# Patient Record
Sex: FEMALE | Race: OTHER | NOT HISPANIC OR LATINO
[De-identification: names, ages, dates, MRNs, and addresses within clinical notes are randomized per-mention and may not be internally consistent; named-entity substitution may affect disease eponyms.]

---

## 2017-07-12 ENCOUNTER — APPOINTMENT (OUTPATIENT)
Dept: MRI IMAGING | Facility: CLINIC | Age: 52
End: 2017-07-12

## 2017-07-12 ENCOUNTER — OUTPATIENT (OUTPATIENT)
Dept: OUTPATIENT SERVICES | Facility: HOSPITAL | Age: 52
LOS: 1 days | End: 2017-07-12
Payer: COMMERCIAL

## 2017-07-12 DIAGNOSIS — Z00.8 ENCOUNTER FOR OTHER GENERAL EXAMINATION: ICD-10-CM

## 2017-07-12 PROCEDURE — 73718 MRI LOWER EXTREMITY W/O DYE: CPT

## 2017-08-26 ENCOUNTER — EMERGENCY (EMERGENCY)
Facility: HOSPITAL | Age: 52
LOS: 0 days | Discharge: HOME | End: 2017-08-27
Attending: FAMILY MEDICINE | Admitting: FAMILY MEDICINE
Payer: COMMERCIAL

## 2017-08-26 VITALS — HEIGHT: 68 IN | WEIGHT: 235.01 LBS

## 2017-08-26 DIAGNOSIS — R10.9 UNSPECIFIED ABDOMINAL PAIN: ICD-10-CM

## 2017-08-26 DIAGNOSIS — K29.70 GASTRITIS, UNSPECIFIED, WITHOUT BLEEDING: ICD-10-CM

## 2017-08-26 PROCEDURE — 99285 EMERGENCY DEPT VISIT HI MDM: CPT | Mod: 25

## 2017-08-26 RX ORDER — SODIUM CHLORIDE 9 MG/ML
3 INJECTION INTRAMUSCULAR; INTRAVENOUS; SUBCUTANEOUS ONCE
Qty: 0 | Refills: 0 | Status: COMPLETED | OUTPATIENT
Start: 2017-08-26 | End: 2017-08-26

## 2017-08-26 RX ORDER — HYDROMORPHONE HYDROCHLORIDE 2 MG/ML
1 INJECTION INTRAMUSCULAR; INTRAVENOUS; SUBCUTANEOUS ONCE
Qty: 0 | Refills: 0 | Status: DISCONTINUED | OUTPATIENT
Start: 2017-08-26 | End: 2017-08-26

## 2017-08-26 RX ORDER — SODIUM CHLORIDE 9 MG/ML
1000 INJECTION INTRAMUSCULAR; INTRAVENOUS; SUBCUTANEOUS ONCE
Qty: 0 | Refills: 0 | Status: COMPLETED | OUTPATIENT
Start: 2017-08-26 | End: 2017-08-26

## 2017-08-26 RX ORDER — PANTOPRAZOLE SODIUM 20 MG/1
40 TABLET, DELAYED RELEASE ORAL ONCE
Qty: 0 | Refills: 0 | Status: COMPLETED | OUTPATIENT
Start: 2017-08-26 | End: 2017-08-26

## 2017-08-26 RX ORDER — ONDANSETRON 8 MG/1
8 TABLET, FILM COATED ORAL ONCE
Qty: 0 | Refills: 0 | Status: COMPLETED | OUTPATIENT
Start: 2017-08-26 | End: 2017-08-27

## 2017-08-26 NOTE — ED PROVIDER NOTE - PROGRESS NOTE DETAILS
Shayna Tadeo: Chest X-ray normal JG:  Patients symptoms have improved.  Pt. will treat her nausea with zofran (prescription sent to pharmacy) and will follow up with PMD or return to ED if symptoms return or worsen.

## 2017-08-26 NOTE — ED PROVIDER NOTE - CONSTITUTIONAL, MLM
normal... Well appearing, well nourished, awake, alert, oriented to person, place, time/situation, Uncomfortable.

## 2017-08-26 NOTE — ED PROVIDER NOTE - OBJECTIVE STATEMENT
52 y-o Female with no significant PMHX, present to the ED c/o vomiting. Pt states she went to birthday party where she ate pasta. At 5pm PT developed ABD pain, at 6:30pm PT started to vomit x6 times since arrival to ED. C/o of epigastric pain that radiates to the left side and back, Sx include chills. Premenopausal, LNMP in June. PMD Chantell 52 y-o Female with no significant PMHX, present to the ED c/o vomiting. Pt states she went to birthday party where she ate Lasagna. At 5pm PT developed ABD pain, at 6:30pm PT started to vomit x6 since arrival to ED. C/o of epigastric pain that radiates to the left side and back, Sx include chills. Premenopausal, LNMP in June. PMD Chantell 52 y-o Female with no significant PMHX, present to the ED c/o vomiting. Pt states she went to birthday party where she ate Lasagna this afternoon. At 5pm PT developed ABD pain, at 6:30pm PT started to vomit x6 since arrival to ED. C/o of epigastric pain that radiates to the left side and back, Sx include chills. Premenopausal, LNMP in June. Denies any subjective fever PMD Chantell

## 2017-08-27 VITALS
DIASTOLIC BLOOD PRESSURE: 76 MMHG | SYSTOLIC BLOOD PRESSURE: 119 MMHG | HEART RATE: 78 BPM | TEMPERATURE: 98 F | OXYGEN SATURATION: 100 % | RESPIRATION RATE: 16 BRPM

## 2017-08-27 LAB
ABO RH CONFIRMATION: SIGNIFICANT CHANGE UP
ALBUMIN SERPL ELPH-MCNC: 3.5 G/DL — SIGNIFICANT CHANGE UP (ref 3.3–5)
ALP SERPL-CCNC: 95 U/L — SIGNIFICANT CHANGE UP (ref 40–120)
ALT FLD-CCNC: 29 U/L — SIGNIFICANT CHANGE UP (ref 12–78)
ANION GAP SERPL CALC-SCNC: 7 MMOL/L — SIGNIFICANT CHANGE UP (ref 5–17)
APPEARANCE UR: CLEAR — SIGNIFICANT CHANGE UP
APTT BLD: 28.4 SEC — SIGNIFICANT CHANGE UP (ref 27.5–37.4)
AST SERPL-CCNC: 14 U/L — LOW (ref 15–37)
BACTERIA # UR AUTO: (no result)
BILIRUB SERPL-MCNC: 0.7 MG/DL — SIGNIFICANT CHANGE UP (ref 0.2–1.2)
BILIRUB UR-MCNC: NEGATIVE — SIGNIFICANT CHANGE UP
BLD GP AB SCN SERPL QL: SIGNIFICANT CHANGE UP
BUN SERPL-MCNC: 14 MG/DL — SIGNIFICANT CHANGE UP (ref 7–23)
CALCIUM SERPL-MCNC: 8.4 MG/DL — LOW (ref 8.5–10.1)
CHLORIDE SERPL-SCNC: 105 MMOL/L — SIGNIFICANT CHANGE UP (ref 96–108)
CO2 SERPL-SCNC: 27 MMOL/L — SIGNIFICANT CHANGE UP (ref 22–31)
COLOR SPEC: YELLOW — SIGNIFICANT CHANGE UP
CREAT SERPL-MCNC: 0.81 MG/DL — SIGNIFICANT CHANGE UP (ref 0.5–1.3)
DIFF PNL FLD: (no result)
EPI CELLS # UR: SIGNIFICANT CHANGE UP
GLUCOSE SERPL-MCNC: 123 MG/DL — HIGH (ref 70–99)
GLUCOSE UR QL: NEGATIVE MG/DL — SIGNIFICANT CHANGE UP
HCT VFR BLD CALC: 38.2 % — SIGNIFICANT CHANGE UP (ref 34.5–45)
HGB BLD-MCNC: 13.2 G/DL — SIGNIFICANT CHANGE UP (ref 11.5–15.5)
INR BLD: 1.03 RATIO — SIGNIFICANT CHANGE UP (ref 0.88–1.16)
KETONES UR-MCNC: NEGATIVE — SIGNIFICANT CHANGE UP
LEUKOCYTE ESTERASE UR-ACNC: NEGATIVE — SIGNIFICANT CHANGE UP
LIDOCAIN IGE QN: 68 U/L — LOW (ref 73–393)
MANUAL DIF COMMENT BLD-IMP: SIGNIFICANT CHANGE UP
MCHC RBC-ENTMCNC: 27.7 PG — SIGNIFICANT CHANGE UP (ref 27–34)
MCHC RBC-ENTMCNC: 34.4 GM/DL — SIGNIFICANT CHANGE UP (ref 32–36)
MCV RBC AUTO: 80.6 FL — SIGNIFICANT CHANGE UP (ref 80–100)
NEUTROPHILS NFR BLD AUTO: 93.2 % — HIGH (ref 43–77)
NITRITE UR-MCNC: NEGATIVE — SIGNIFICANT CHANGE UP
PH UR: 8 — SIGNIFICANT CHANGE UP (ref 5–8)
PLAT MORPH BLD: NORMAL — SIGNIFICANT CHANGE UP
PLATELET # BLD AUTO: 183 K/UL — SIGNIFICANT CHANGE UP (ref 150–400)
POTASSIUM SERPL-MCNC: 3.4 MMOL/L — LOW (ref 3.5–5.3)
POTASSIUM SERPL-SCNC: 3.4 MMOL/L — LOW (ref 3.5–5.3)
PROT SERPL-MCNC: 7.2 GM/DL — SIGNIFICANT CHANGE UP (ref 6–8.3)
PROT UR-MCNC: NEGATIVE MG/DL — SIGNIFICANT CHANGE UP
PROTHROM AB SERPL-ACNC: 11.1 SEC — SIGNIFICANT CHANGE UP (ref 9.8–12.7)
RBC # BLD: 4.74 M/UL — SIGNIFICANT CHANGE UP (ref 3.8–5.2)
RBC # FLD: 12.8 % — SIGNIFICANT CHANGE UP (ref 10.3–14.5)
RBC BLD AUTO: NORMAL — SIGNIFICANT CHANGE UP
RBC CASTS # UR COMP ASSIST: (no result) /HPF (ref 0–4)
SODIUM SERPL-SCNC: 139 MMOL/L — SIGNIFICANT CHANGE UP (ref 135–145)
SP GR SPEC: 1.01 — SIGNIFICANT CHANGE UP (ref 1.01–1.02)
TYPE + AB SCN PNL BLD: SIGNIFICANT CHANGE UP
UROBILINOGEN FLD QL: NEGATIVE MG/DL — SIGNIFICANT CHANGE UP
WBC # BLD: 10.1 K/UL — SIGNIFICANT CHANGE UP (ref 3.8–10.5)
WBC # FLD AUTO: 10.1 K/UL — SIGNIFICANT CHANGE UP (ref 3.8–10.5)
WBC UR QL: SIGNIFICANT CHANGE UP

## 2017-08-27 PROCEDURE — 93010 ELECTROCARDIOGRAM REPORT: CPT

## 2017-08-27 PROCEDURE — 74177 CT ABD & PELVIS W/CONTRAST: CPT | Mod: 26

## 2017-08-27 PROCEDURE — 71010: CPT | Mod: 26

## 2017-08-27 RX ORDER — ONDANSETRON 8 MG/1
1 TABLET, FILM COATED ORAL
Qty: 10 | Refills: 0
Start: 2017-08-27

## 2017-08-27 RX ORDER — ONDANSETRON 8 MG/1
4 TABLET, FILM COATED ORAL ONCE
Qty: 0 | Refills: 0 | Status: COMPLETED | OUTPATIENT
Start: 2017-08-27 | End: 2017-08-27

## 2017-08-27 RX ADMIN — ONDANSETRON 8 MILLIGRAM(S): 8 TABLET, FILM COATED ORAL at 00:34

## 2017-08-27 RX ADMIN — HYDROMORPHONE HYDROCHLORIDE 1 MILLIGRAM(S): 2 INJECTION INTRAMUSCULAR; INTRAVENOUS; SUBCUTANEOUS at 01:00

## 2017-08-27 RX ADMIN — SODIUM CHLORIDE 3 MILLILITER(S): 9 INJECTION INTRAMUSCULAR; INTRAVENOUS; SUBCUTANEOUS at 00:34

## 2017-08-27 RX ADMIN — PANTOPRAZOLE SODIUM 40 MILLIGRAM(S): 20 TABLET, DELAYED RELEASE ORAL at 00:34

## 2017-08-27 RX ADMIN — SODIUM CHLORIDE 1000 MILLILITER(S): 9 INJECTION INTRAMUSCULAR; INTRAVENOUS; SUBCUTANEOUS at 00:34

## 2017-08-27 RX ADMIN — ONDANSETRON 4 MILLIGRAM(S): 8 TABLET, FILM COATED ORAL at 04:16

## 2017-08-27 RX ADMIN — HYDROMORPHONE HYDROCHLORIDE 1 MILLIGRAM(S): 2 INJECTION INTRAMUSCULAR; INTRAVENOUS; SUBCUTANEOUS at 00:34

## 2017-08-27 NOTE — ED ADULT NURSE REASSESSMENT NOTE - NS ED NURSE REASSESS COMMENT FT1
Patient resting comfortably, states relief of all symptoms after meds. Drinking PO contrast. States unable to urinate at this time. Awaiting CT. Will continue to monitor.

## 2017-08-31 ENCOUNTER — APPOINTMENT (OUTPATIENT)
Dept: CARDIOLOGY | Facility: CLINIC | Age: 52
End: 2017-08-31

## 2018-04-01 ENCOUNTER — EMERGENCY (EMERGENCY)
Facility: HOSPITAL | Age: 53
LOS: 0 days | Discharge: HOME | End: 2018-04-01
Attending: EMERGENCY MEDICINE | Admitting: EMERGENCY MEDICINE
Payer: COMMERCIAL

## 2018-04-01 VITALS — WEIGHT: 238.98 LBS

## 2018-04-01 VITALS
OXYGEN SATURATION: 100 % | HEART RATE: 76 BPM | TEMPERATURE: 98 F | DIASTOLIC BLOOD PRESSURE: 81 MMHG | SYSTOLIC BLOOD PRESSURE: 136 MMHG | RESPIRATION RATE: 16 BRPM

## 2018-04-01 DIAGNOSIS — R07.9 CHEST PAIN, UNSPECIFIED: ICD-10-CM

## 2018-04-01 DIAGNOSIS — Z82.49 FAMILY HISTORY OF ISCHEMIC HEART DISEASE AND OTHER DISEASES OF THE CIRCULATORY SYSTEM: ICD-10-CM

## 2018-04-01 LAB
ALBUMIN SERPL ELPH-MCNC: 3.8 G/DL — SIGNIFICANT CHANGE UP (ref 3.3–5)
ALP SERPL-CCNC: 98 U/L — SIGNIFICANT CHANGE UP (ref 40–120)
ALT FLD-CCNC: 26 U/L — SIGNIFICANT CHANGE UP (ref 12–78)
ANION GAP SERPL CALC-SCNC: 7 MMOL/L — SIGNIFICANT CHANGE UP (ref 5–17)
APTT BLD: 28.5 SEC — SIGNIFICANT CHANGE UP (ref 27.5–37.4)
AST SERPL-CCNC: 18 U/L — SIGNIFICANT CHANGE UP (ref 15–37)
BASOPHILS # BLD AUTO: 0.02 K/UL — SIGNIFICANT CHANGE UP (ref 0–0.2)
BASOPHILS NFR BLD AUTO: 0.2 % — SIGNIFICANT CHANGE UP (ref 0–2)
BILIRUB SERPL-MCNC: 0.3 MG/DL — SIGNIFICANT CHANGE UP (ref 0.2–1.2)
BUN SERPL-MCNC: 21 MG/DL — SIGNIFICANT CHANGE UP (ref 7–23)
CALCIUM SERPL-MCNC: 8.9 MG/DL — SIGNIFICANT CHANGE UP (ref 8.5–10.1)
CHLORIDE SERPL-SCNC: 103 MMOL/L — SIGNIFICANT CHANGE UP (ref 96–108)
CO2 SERPL-SCNC: 29 MMOL/L — SIGNIFICANT CHANGE UP (ref 22–31)
CREAT SERPL-MCNC: 1.06 MG/DL — SIGNIFICANT CHANGE UP (ref 0.5–1.3)
EOSINOPHIL # BLD AUTO: 0.08 K/UL — SIGNIFICANT CHANGE UP (ref 0–0.5)
EOSINOPHIL NFR BLD AUTO: 1 % — SIGNIFICANT CHANGE UP (ref 0–6)
GLUCOSE SERPL-MCNC: 106 MG/DL — HIGH (ref 70–99)
HCT VFR BLD CALC: 38.7 % — SIGNIFICANT CHANGE UP (ref 34.5–45)
HGB BLD-MCNC: 12.9 G/DL — SIGNIFICANT CHANGE UP (ref 11.5–15.5)
IMM GRANULOCYTES NFR BLD AUTO: 0.2 % — SIGNIFICANT CHANGE UP (ref 0–1.5)
INR BLD: 0.99 RATIO — SIGNIFICANT CHANGE UP (ref 0.88–1.16)
LYMPHOCYTES # BLD AUTO: 1.84 K/UL — SIGNIFICANT CHANGE UP (ref 1–3.3)
LYMPHOCYTES # BLD AUTO: 22.5 % — SIGNIFICANT CHANGE UP (ref 13–44)
MCHC RBC-ENTMCNC: 27 PG — SIGNIFICANT CHANGE UP (ref 27–34)
MCHC RBC-ENTMCNC: 33.3 GM/DL — SIGNIFICANT CHANGE UP (ref 32–36)
MCV RBC AUTO: 81.1 FL — SIGNIFICANT CHANGE UP (ref 80–100)
MONOCYTES # BLD AUTO: 0.52 K/UL — SIGNIFICANT CHANGE UP (ref 0–0.9)
MONOCYTES NFR BLD AUTO: 6.4 % — SIGNIFICANT CHANGE UP (ref 2–14)
NEUTROPHILS # BLD AUTO: 5.69 K/UL — SIGNIFICANT CHANGE UP (ref 1.8–7.4)
NEUTROPHILS NFR BLD AUTO: 69.7 % — SIGNIFICANT CHANGE UP (ref 43–77)
NRBC # BLD: 0 /100 WBCS — SIGNIFICANT CHANGE UP (ref 0–0)
NT-PROBNP SERPL-SCNC: 103 PG/ML — SIGNIFICANT CHANGE UP (ref 0–125)
PLATELET # BLD AUTO: 222 K/UL — SIGNIFICANT CHANGE UP (ref 150–400)
POTASSIUM SERPL-MCNC: 3.4 MMOL/L — LOW (ref 3.5–5.3)
POTASSIUM SERPL-SCNC: 3.4 MMOL/L — LOW (ref 3.5–5.3)
PROT SERPL-MCNC: 7.8 GM/DL — SIGNIFICANT CHANGE UP (ref 6–8.3)
PROTHROM AB SERPL-ACNC: 10.7 SEC — SIGNIFICANT CHANGE UP (ref 9.8–12.7)
RBC # BLD: 4.77 M/UL — SIGNIFICANT CHANGE UP (ref 3.8–5.2)
RBC # FLD: 14 % — SIGNIFICANT CHANGE UP (ref 10.3–14.5)
SODIUM SERPL-SCNC: 139 MMOL/L — SIGNIFICANT CHANGE UP (ref 135–145)
TROPONIN I SERPL-MCNC: <0.015 NG/ML — SIGNIFICANT CHANGE UP (ref 0.01–0.04)
TROPONIN I SERPL-MCNC: <0.015 NG/ML — SIGNIFICANT CHANGE UP (ref 0.01–0.04)
WBC # BLD: 8.17 K/UL — SIGNIFICANT CHANGE UP (ref 3.8–10.5)
WBC # FLD AUTO: 8.17 K/UL — SIGNIFICANT CHANGE UP (ref 3.8–10.5)

## 2018-04-01 PROCEDURE — 99285 EMERGENCY DEPT VISIT HI MDM: CPT

## 2018-04-01 PROCEDURE — 71045 X-RAY EXAM CHEST 1 VIEW: CPT | Mod: 26

## 2018-04-01 PROCEDURE — 93010 ELECTROCARDIOGRAM REPORT: CPT

## 2018-04-01 RX ORDER — SODIUM CHLORIDE 9 MG/ML
3 INJECTION INTRAMUSCULAR; INTRAVENOUS; SUBCUTANEOUS ONCE
Qty: 0 | Refills: 0 | Status: COMPLETED | OUTPATIENT
Start: 2018-04-01 | End: 2018-04-01

## 2018-04-01 RX ORDER — ASPIRIN/CALCIUM CARB/MAGNESIUM 324 MG
325 TABLET ORAL ONCE
Qty: 0 | Refills: 0 | Status: COMPLETED | OUTPATIENT
Start: 2018-04-01 | End: 2018-04-01

## 2018-04-01 RX ADMIN — SODIUM CHLORIDE 3 MILLILITER(S): 9 INJECTION INTRAMUSCULAR; INTRAVENOUS; SUBCUTANEOUS at 18:03

## 2018-04-01 RX ADMIN — Medication 325 MILLIGRAM(S): at 18:03

## 2018-04-01 NOTE — ED STATDOCS - NS_ ATTENDINGSCRIBEDETAILS _ED_A_ED_FT
I, Rafael Peres MD,  performed the initial face to face bedside interview with this patient regarding history of present illness, review of symptoms and relevant past medical, social and family history.  I completed an independent physical examination.    The history, relevant review of systems, past medical and surgical history, medical decision making, and physical examination was documented by the scribe in my presence and I attest to the accuracy of the documentation.

## 2018-04-01 NOTE — ED ADULT NURSE NOTE - OBJECTIVE STATEMENT
Pt c/o chest pain midsternal which began a few days ago. Pt denies previous cardiac hx. Denies SOB at this time. Denies use of any aspirin/nitro and denies radiation of pain.

## 2018-04-01 NOTE — ED STATDOCS - OBJECTIVE STATEMENT
54 y/o F with no pertinent PMHx presents to the ED c/o intermittent CP since this past Friday. Pt states that she felt okay yesterday, but discomfort returned today, currently experiencing here in the ED. Pt states that most of her family has cardiac issues, no early sudden cardiac death prompting her to come in to be eval. Pt currently calm, able to provide adequate hx and denies fever, cough, chills, SOB, abd pain, vomiting, diarrhea or any other acute c/o at this time, took Aleve with no alleviation of sx. PMD Jasmin. Cardio Rekha.

## 2018-04-01 NOTE — ED STATDOCS - ATTENDING CONTRIBUTION TO CARE
I, Rafael Peres MD,  performed the initial face to face bedside interview with this patient regarding history of present illness, review of symptoms and relevant past medical, social and family history.  I completed an independent physical examination.  I was the initial provider who evaluated this patient. I have signed out the follow up of any pending tests (i.e. labs, radiological studies) to the ACP.  I have communicated the patient’s plan of care and disposition with the ACP.

## 2018-04-01 NOTE — ED STATDOCS - PROGRESS NOTE DETAILS
SUSANA Klein: SUSANA Klein:  [facility] she presented with intermittent CP, denies any SOB. I will followup the labs with trop x 2, if neg will d chome. SUSANA Guyit:  Pt feeling well, denies any complaint. Trop x 2 neg. will dc home for outpt f/u with PMD and cardiologist.   An opportunity to ask questions was given.  Discussed the importance of prompt, close medical follow-up.  Patient will return with any changes, concerns or persistent / worsening symptoms.  Understanding of all instructions verbalized.

## 2018-04-01 NOTE — ED ADULT NURSE NOTE - DRUG PRE-SCREENING (DAST -1)
Intensive Care Unit   Advanced Practice Exam & Daily Communication Note    Patient Active Problem List   Diagnosis     RDS (respiratory distress syndrome in the )     Duodenal atresia     Malnutrition (H)     Need for observation and evaluation of  for sepsis     Hand anomaly     SGA (small for gestational age)      , gestational age 33 completed weeks     Twin birth     Temperature instability in      Trisomy 21       Vital Signs:  Temp:  [97.9  F (36.6  C)-98.9  F (37.2  C)] 98  F (36.7  C)  Heart Rate:  [110-154] 146  Resp:  [28-48] 48  BP: (76-85)/(46-60) 81/53  SpO2:  [97 %-100 %] 100 %    Weight:  Wt Readings from Last 1 Encounters:   18 1.43 kg (3 lb 2.4 oz) (<1 %)*     * Growth percentiles are based on Down Syndrome (0-36 Months) data.         Physical Exam:  General: Resting comfortably in isolette. In no acute distress.  HEENT: Normocephalic. Anterior fontanelle soft, flat. Scalp intact.  Sutures approximated and mobile. Eyes clear of drainage. Nose midline, nares appear patent. Neck supple.  Cardiovascular: Regular rate and rhythm. Soft murmur.    Peripheral/femoral pulses present, normal and symmetric. Extremities warm. Capillary refill <3 seconds peripherally and centrally.     Respiratory: Breath sounds clear with good aeration bilaterally.  No retractions or nasal flaring noted. No respiratory support in place.  Gastrointestinal: Abdomen full, soft. Hypoactive bowel sounds. Cord dry.  : Normal male genitalia, anus patent and appropriately positioned.     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 Muscle hypotonic for gestation.  Skin: Warm, pink, slight jaundice, no skin breakdown.    Neurologic: Hypotonic, reflexes symmetric and normal for gestation.     Parent Communication:  Parents were updated during rounds.    Scarlett JETER, CNP 2018 11:58 AM               Statement Selected

## 2018-04-01 NOTE — ED ADULT NURSE NOTE - IN THE PAST 12 MONTHS HAVE YOU USED DRUGS OTHER THAN THOSE REQUIRED FOR MEDICAL REASON?
Quality 226: Preventive Care And Screening: Tobacco Use: Screening And Cessation Intervention: Patient screened for tobacco and never smoked Detail Level: Detailed Quality 131: Pain Assessment And Follow-Up: Pain assessment using a standardized tool is documented as negative, no follow-up plan required Quality 111:Pneumonia Vaccination Status For Older Adults: Pneumococcal Vaccination Previously Received No

## 2018-06-14 ENCOUNTER — APPOINTMENT (OUTPATIENT)
Dept: DERMATOLOGY | Facility: CLINIC | Age: 53
End: 2018-06-14
Payer: COMMERCIAL

## 2018-06-14 VITALS — HEIGHT: 67 IN | WEIGHT: 231 LBS | BODY MASS INDEX: 36.26 KG/M2

## 2018-06-14 PROCEDURE — 99213 OFFICE O/P EST LOW 20 MIN: CPT

## 2018-06-14 RX ORDER — LEVOFLOXACIN 500 MG/1
500 TABLET, FILM COATED ORAL
Qty: 7 | Refills: 0 | Status: DISCONTINUED | COMMUNITY
Start: 2018-04-19

## 2018-06-14 RX ORDER — FLUTICASONE PROPIONATE 50 UG/1
50 SPRAY, METERED NASAL
Qty: 16 | Refills: 0 | Status: DISCONTINUED | COMMUNITY
Start: 2018-05-24

## 2018-06-14 RX ORDER — AMOXICILLIN AND CLAVULANATE POTASSIUM 875; 125 MG/1; MG/1
875-125 TABLET, COATED ORAL
Qty: 14 | Refills: 0 | Status: DISCONTINUED | COMMUNITY
Start: 2018-05-29

## 2019-07-23 NOTE — ED STATDOCS - RATE
Include Z78.9 (Other Specified Conditions Influencing Health Status) As An Associated Diagnosis?: No Post-Care Instructions: I reviewed with the patient in detail post-care instructions. Patient is to wear sunprotection, and avoid picking at any of the treated lesions. Pt may apply Vaseline to crusted or scabbing areas. 81 Medical Necessity Information: It is in your best interest to select a reason for this procedure from the list below. All of these items fulfill various CMS LCD requirements except the new and changing color options. Detail Level: Detailed Medical Necessity Clause: This procedure was medically necessary because the lesions that were treated were: Consent: The patient's consent was obtained including but not limited to risks of crusting, scabbing, blistering, scarring, darker or lighter pigmentary change, recurrence, incomplete removal and infection.

## 2020-10-27 ENCOUNTER — NON-APPOINTMENT (OUTPATIENT)
Age: 55
End: 2020-10-27

## 2020-10-27 ENCOUNTER — APPOINTMENT (OUTPATIENT)
Dept: INTERNAL MEDICINE | Facility: CLINIC | Age: 55
End: 2020-10-27
Payer: COMMERCIAL

## 2020-10-27 VITALS
SYSTOLIC BLOOD PRESSURE: 104 MMHG | BODY MASS INDEX: 37.89 KG/M2 | RESPIRATION RATE: 16 BRPM | TEMPERATURE: 96.8 F | WEIGHT: 250 LBS | HEIGHT: 68 IN | DIASTOLIC BLOOD PRESSURE: 70 MMHG | OXYGEN SATURATION: 97 % | HEART RATE: 76 BPM

## 2020-10-27 VITALS — DIASTOLIC BLOOD PRESSURE: 60 MMHG | SYSTOLIC BLOOD PRESSURE: 108 MMHG

## 2020-10-27 DIAGNOSIS — E66.3 OVERWEIGHT: ICD-10-CM

## 2020-10-27 DIAGNOSIS — Z82.3 FAMILY HISTORY OF STROKE: ICD-10-CM

## 2020-10-27 DIAGNOSIS — Z82.49 FAMILY HISTORY OF ISCHEMIC HEART DISEASE AND OTHER DISEASES OF THE CIRCULATORY SYSTEM: ICD-10-CM

## 2020-10-27 DIAGNOSIS — D22.9 MELANOCYTIC NEVI, UNSPECIFIED: ICD-10-CM

## 2020-10-27 DIAGNOSIS — Z83.3 FAMILY HISTORY OF DIABETES MELLITUS: ICD-10-CM
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
## 2020-10-27 PROCEDURE — 99396 PREV VISIT EST AGE 40-64: CPT | Mod: 25

## 2020-10-27 PROCEDURE — 99072 ADDL SUPL MATRL&STAF TM PHE: CPT

## 2020-10-27 PROCEDURE — 93000 ELECTROCARDIOGRAM COMPLETE: CPT

## 2020-10-27 PROCEDURE — 36415 COLL VENOUS BLD VENIPUNCTURE: CPT

## 2020-10-27 PROCEDURE — 90471 IMMUNIZATION ADMIN: CPT

## 2020-10-27 PROCEDURE — 90715 TDAP VACCINE 7 YRS/> IM: CPT

## 2020-10-27 RX ORDER — FLUOCINONIDE 0.5 MG/G
0.05 CREAM TOPICAL TWICE DAILY
Qty: 30 | Refills: 1 | Status: DISCONTINUED | COMMUNITY
Start: 2018-06-14 | End: 2020-10-27

## 2020-10-27 NOTE — PHYSICAL EXAM
[No Acute Distress] : no acute distress [Well Nourished] : well nourished [Well Developed] : well developed [Well-Appearing] : well-appearing [Normal Sclera/Conjunctiva] : normal sclera/conjunctiva [PERRL] : pupils equal round and reactive to light [EOMI] : extraocular movements intact [Normal Outer Ear/Nose] : the outer ears and nose were normal in appearance [Normal Oropharynx] : the oropharynx was normal [No JVD] : no jugular venous distention [No Lymphadenopathy] : no lymphadenopathy [Supple] : supple [Thyroid Normal, No Nodules] : the thyroid was normal and there were no nodules present [No Respiratory Distress] : no respiratory distress  [No Accessory Muscle Use] : no accessory muscle use [Clear to Auscultation] : lungs were clear to auscultation bilaterally [Normal Rate] : normal rate  [Regular Rhythm] : with a regular rhythm [Normal S1, S2] : normal S1 and S2 [No Murmur] : no murmur heard [No Carotid Bruits] : no carotid bruits [No Abdominal Bruit] : a ~M bruit was not heard ~T in the abdomen [No Varicosities] : no varicosities [Pedal Pulses Present] : the pedal pulses are present [No Edema] : there was no peripheral edema [No Palpable Aorta] : no palpable aorta [No Extremity Clubbing/Cyanosis] : no extremity clubbing/cyanosis [Normal Appearance] : normal in appearance [Soft] : abdomen soft [Non Tender] : non-tender [Non-distended] : non-distended [No Masses] : no abdominal mass palpated [No HSM] : no HSM [Normal Bowel Sounds] : normal bowel sounds [Normal Sphincter Tone] : normal sphincter tone [No Mass] : no mass [Normal Posterior Cervical Nodes] : no posterior cervical lymphadenopathy [Normal Anterior Cervical Nodes] : no anterior cervical lymphadenopathy [No CVA Tenderness] : no CVA  tenderness [No Spinal Tenderness] : no spinal tenderness [No Joint Swelling] : no joint swelling [Grossly Normal Strength/Tone] : grossly normal strength/tone [No Rash] : no rash [Coordination Grossly Intact] : coordination grossly intact [No Focal Deficits] : no focal deficits [Normal Gait] : normal gait [Normal Affect] : the affect was normal [Normal Insight/Judgement] : insight and judgment were intact [Stool Occult Blood] : stool negative for occult blood

## 2020-10-27 NOTE — HEALTH RISK ASSESSMENT
[Yes] : Yes [Monthly or less (1 pt)] : Monthly or less (1 point) [1 or 2 (0 pts)] : 1 or 2 (0 points) [Never (0 pts)] : Never (0 points) [No] : In the past 12 months have you used drugs other than those required for medical reasons? No [0] : 2) Feeling down, depressed, or hopeless: Not at all (0) [With Family] : lives with family [Employed] : employed [] :  [# Of Children ___] : has [unfilled] children [Feels Safe at Home] : Feels safe at home [Patient reported PAP Smear was normal] : Patient reported PAP Smear was normal [Patient/Caregiver not ready to engage] : Patient/Caregiver not ready to engage [Seat Belt] :  uses seat belt [Sunscreen] : uses sunscreen [] : No [de-identified] : not regular [de-identified] : will beginning diet for weight loss [EyeExamDate] : 1/1/19 [Reports changes in vision] : Reports no changes in vision [Reports changes in dental health] : Reports no changes in dental health [MammogramDate] : 1/1/19 [PapSmearDate] : 1/1/19 [ColonoscopyDate] : 1/1/17 [ColonoscopyComments] : polyp [FreeTextEntry2] : work at [employer] [FreeTextEntry3] : 1 grand child age 2 years old  lives in [place]

## 2020-10-28 LAB
25(OH)D3 SERPL-MCNC: 27 NG/ML
ALBUMIN SERPL ELPH-MCNC: 4.7 G/DL
ALP BLD-CCNC: 90 U/L
ALT SERPL-CCNC: 31 U/L
ANION GAP SERPL CALC-SCNC: 10 MMOL/L
AST SERPL-CCNC: 24 U/L
BASOPHILS # BLD AUTO: 0.02 K/UL
BASOPHILS NFR BLD AUTO: 0.4 %
BILIRUB SERPL-MCNC: 0.5 MG/DL
BUN SERPL-MCNC: 14 MG/DL
CALCIUM SERPL-MCNC: 9.9 MG/DL
CHLORIDE SERPL-SCNC: 107 MMOL/L
CHOLEST SERPL-MCNC: 170 MG/DL
CO2 SERPL-SCNC: 26 MMOL/L
CREAT SERPL-MCNC: 0.88 MG/DL
EOSINOPHIL # BLD AUTO: 0.06 K/UL
EOSINOPHIL NFR BLD AUTO: 1.2 %
ESTIMATED AVERAGE GLUCOSE: 88 MG/DL
GLUCOSE SERPL-MCNC: 102 MG/DL
HBA1C MFR BLD HPLC: 4.7 %
HCT VFR BLD CALC: 41.2 %
HDLC SERPL-MCNC: 37 MG/DL
HGB BLD-MCNC: 13 G/DL
IMM GRANULOCYTES NFR BLD AUTO: 0.2 %
LDLC SERPL CALC-MCNC: 110 MG/DL
LYMPHOCYTES # BLD AUTO: 1.63 K/UL
LYMPHOCYTES NFR BLD AUTO: 33.5 %
MAN DIFF?: NORMAL
MCHC RBC-ENTMCNC: 27.8 PG
MCHC RBC-ENTMCNC: 31.6 GM/DL
MCV RBC AUTO: 88.2 FL
MONOCYTES # BLD AUTO: 0.36 K/UL
MONOCYTES NFR BLD AUTO: 7.4 %
NEUTROPHILS # BLD AUTO: 2.79 K/UL
NEUTROPHILS NFR BLD AUTO: 57.3 %
NONHDLC SERPL-MCNC: 134 MG/DL
PLATELET # BLD AUTO: 230 K/UL
POTASSIUM SERPL-SCNC: 4.9 MMOL/L
PROT SERPL-MCNC: 7.4 G/DL
RBC # BLD: 4.67 M/UL
RBC # FLD: 15.2 %
SARS-COV-2 IGG SERPL IA-ACNC: 123 INDEX
SARS-COV-2 IGG SERPL QL IA: POSITIVE
SODIUM SERPL-SCNC: 143 MMOL/L
T4 SERPL-MCNC: 8.1 UG/DL
TRIGL SERPL-MCNC: 118 MG/DL
TSH SERPL-ACNC: 2.14 UIU/ML
WBC # FLD AUTO: 4.87 K/UL

## 2020-10-29 ENCOUNTER — NON-APPOINTMENT (OUTPATIENT)
Age: 55
End: 2020-10-29

## 2020-11-03 ENCOUNTER — APPOINTMENT (OUTPATIENT)
Dept: INTERNAL MEDICINE | Facility: CLINIC | Age: 55
End: 2020-11-03
Payer: COMMERCIAL

## 2020-11-03 PROCEDURE — 90471 IMMUNIZATION ADMIN: CPT

## 2020-11-03 PROCEDURE — 99072 ADDL SUPL MATRL&STAF TM PHE: CPT

## 2020-11-03 PROCEDURE — 90750 HZV VACC RECOMBINANT IM: CPT

## 2020-11-09 ENCOUNTER — NON-APPOINTMENT (OUTPATIENT)
Age: 55
End: 2020-11-09

## 2020-11-11 ENCOUNTER — NON-APPOINTMENT (OUTPATIENT)
Age: 55
End: 2020-11-11

## 2020-11-12 ENCOUNTER — NON-APPOINTMENT (OUTPATIENT)
Age: 55
End: 2020-11-12

## 2020-11-12 DIAGNOSIS — Z82.49 FAMILY HISTORY OF ISCHEMIC HEART DISEASE AND OTHER DISEASES OF THE CIRCULATORY SYSTEM: ICD-10-CM

## 2020-11-12 DIAGNOSIS — Z80.0 FAMILY HISTORY OF MALIGNANT NEOPLASM OF DIGESTIVE ORGANS: ICD-10-CM

## 2021-01-13 ENCOUNTER — APPOINTMENT (OUTPATIENT)
Dept: INTERNAL MEDICINE | Facility: CLINIC | Age: 56
End: 2021-01-13

## 2021-03-15 ENCOUNTER — APPOINTMENT (OUTPATIENT)
Dept: INTERNAL MEDICINE | Facility: CLINIC | Age: 56
End: 2021-03-15
Payer: COMMERCIAL

## 2021-03-15 PROCEDURE — 90471 IMMUNIZATION ADMIN: CPT

## 2021-03-15 PROCEDURE — 90750 HZV VACC RECOMBINANT IM: CPT

## 2021-03-15 PROCEDURE — 99072 ADDL SUPL MATRL&STAF TM PHE: CPT

## 2021-07-13 ENCOUNTER — NON-APPOINTMENT (OUTPATIENT)
Age: 56
End: 2021-07-13

## 2021-08-04 ENCOUNTER — NON-APPOINTMENT (OUTPATIENT)
Age: 56
End: 2021-08-04

## 2021-08-04 ENCOUNTER — TRANSCRIPTION ENCOUNTER (OUTPATIENT)
Age: 56
End: 2021-08-04

## 2021-08-04 LAB
COVID-19 NUCLEOCAPSID  GAM ANTIBODY INTERPRETATION: POSITIVE
SARS-COV-2 AB SERPL QL IA: 84.3 INDEX
SARS-COV-2 N GENE NPH QL NAA+PROBE: NOT DETECTED

## 2021-12-20 ENCOUNTER — NON-APPOINTMENT (OUTPATIENT)
Age: 56
End: 2021-12-20

## 2022-01-05 ENCOUNTER — RESULT REVIEW (OUTPATIENT)
Age: 57
End: 2022-01-05

## 2022-01-19 ENCOUNTER — NON-APPOINTMENT (OUTPATIENT)
Age: 57
End: 2022-01-19

## 2022-01-19 ENCOUNTER — APPOINTMENT (OUTPATIENT)
Dept: INTERNAL MEDICINE | Facility: CLINIC | Age: 57
End: 2022-01-19
Payer: COMMERCIAL

## 2022-01-19 VITALS
WEIGHT: 263 LBS | HEIGHT: 68 IN | TEMPERATURE: 98 F | SYSTOLIC BLOOD PRESSURE: 128 MMHG | DIASTOLIC BLOOD PRESSURE: 78 MMHG | BODY MASS INDEX: 39.86 KG/M2 | OXYGEN SATURATION: 99 % | HEART RATE: 83 BPM

## 2022-01-19 DIAGNOSIS — M54.50 LOW BACK PAIN, UNSPECIFIED: ICD-10-CM

## 2022-01-19 DIAGNOSIS — S76.919A STRAIN OF UNSPECIFIED MUSCLES, FASCIA AND TENDONS AT THIGH LEVEL, UNSPECIFIED THIGH, INITIAL ENCOUNTER: ICD-10-CM

## 2022-01-19 DIAGNOSIS — R92.8 OTHER ABNORMAL AND INCONCLUSIVE FINDINGS ON DIAGNOSTIC IMAGING OF BREAST: ICD-10-CM

## 2022-01-19 DIAGNOSIS — Z82.49 FAMILY HISTORY OF ISCHEMIC HEART DISEASE AND OTHER DISEASES OF THE CIRCULATORY SYSTEM: ICD-10-CM

## 2022-01-19 PROCEDURE — 99396 PREV VISIT EST AGE 40-64: CPT | Mod: 25

## 2022-01-19 PROCEDURE — 96127 BRIEF EMOTIONAL/BEHAV ASSMT: CPT

## 2022-01-19 PROCEDURE — 93000 ELECTROCARDIOGRAM COMPLETE: CPT

## 2022-01-19 PROCEDURE — 36415 COLL VENOUS BLD VENIPUNCTURE: CPT

## 2022-01-19 NOTE — HEALTH RISK ASSESSMENT
[Never] : Never [Yes] : Yes [Monthly or less (1 pt)] : Monthly or less (1 point) [1 or 2 (0 pts)] : 1 or 2 (0 points) [Never (0 pts)] : Never (0 points) [No] : In the past 12 months have you used drugs other than those required for medical reasons? No [0] : 2) Feeling down, depressed, or hopeless: Not at all (0) [EyeExamDate] : 2021 [MammogramDate] : 12/2021 [PapSmearDate] : 2021 [BoneDensityDate] : 11/2020

## 2022-01-20 DIAGNOSIS — E55.9 VITAMIN D DEFICIENCY, UNSPECIFIED: ICD-10-CM

## 2022-01-20 LAB
25(OH)D3 SERPL-MCNC: 16.3 NG/ML
ALBUMIN SERPL ELPH-MCNC: 4.2 G/DL
ALP BLD-CCNC: 120 U/L
ALT SERPL-CCNC: 24 U/L
ANION GAP SERPL CALC-SCNC: 12 MMOL/L
APPEARANCE: CLEAR
AST SERPL-CCNC: 16 U/L
BACTERIA: NEGATIVE
BASOPHILS # BLD AUTO: 0.02 K/UL
BASOPHILS NFR BLD AUTO: 0.3 %
BILIRUB SERPL-MCNC: 0.4 MG/DL
BILIRUBIN URINE: NEGATIVE
BLOOD URINE: NEGATIVE
BUN SERPL-MCNC: 16 MG/DL
CALCIUM SERPL-MCNC: 9.3 MG/DL
CHLORIDE SERPL-SCNC: 106 MMOL/L
CHOLEST SERPL-MCNC: 219 MG/DL
CO2 SERPL-SCNC: 23 MMOL/L
COLOR: YELLOW
CREAT SERPL-MCNC: 0.8 MG/DL
EOSINOPHIL # BLD AUTO: 0.1 K/UL
EOSINOPHIL NFR BLD AUTO: 1.4 %
GLUCOSE QUALITATIVE U: NEGATIVE
GLUCOSE SERPL-MCNC: 91 MG/DL
HCT VFR BLD CALC: 41.7 %
HCV AB SER QL: NONREACTIVE
HCV S/CO RATIO: 0.22 S/CO
HDLC SERPL-MCNC: 44 MG/DL
HGB BLD-MCNC: 13 G/DL
HYALINE CASTS: 2 /LPF
IMM GRANULOCYTES NFR BLD AUTO: 0.3 %
KETONES URINE: NEGATIVE
LDLC SERPL CALC-MCNC: 146 MG/DL
LEUKOCYTE ESTERASE URINE: NEGATIVE
LYMPHOCYTES # BLD AUTO: 1.67 K/UL
LYMPHOCYTES NFR BLD AUTO: 23 %
MAN DIFF?: NORMAL
MCHC RBC-ENTMCNC: 26.8 PG
MCHC RBC-ENTMCNC: 31.2 GM/DL
MCV RBC AUTO: 86 FL
MICROSCOPIC-UA: NORMAL
MONOCYTES # BLD AUTO: 0.4 K/UL
MONOCYTES NFR BLD AUTO: 5.5 %
NEUTROPHILS # BLD AUTO: 5.04 K/UL
NEUTROPHILS NFR BLD AUTO: 69.5 %
NITRITE URINE: NEGATIVE
NONHDLC SERPL-MCNC: 175 MG/DL
PH URINE: 5.5
PLATELET # BLD AUTO: 252 K/UL
POTASSIUM SERPL-SCNC: 4.2 MMOL/L
PROT SERPL-MCNC: 7.3 G/DL
PROTEIN URINE: NORMAL
RBC # BLD: 4.85 M/UL
RBC # FLD: 14.9 %
RED BLOOD CELLS URINE: 3 /HPF
SODIUM SERPL-SCNC: 141 MMOL/L
SPECIFIC GRAVITY URINE: 1.02
SQUAMOUS EPITHELIAL CELLS: 2 /HPF
T4 SERPL-MCNC: 7.4 UG/DL
TRIGL SERPL-MCNC: 145 MG/DL
TSH SERPL-ACNC: 1.29 UIU/ML
UROBILINOGEN URINE: NORMAL
WBC # FLD AUTO: 7.25 K/UL
WHITE BLOOD CELLS URINE: 1 /HPF

## 2022-02-04 ENCOUNTER — EMERGENCY (EMERGENCY)
Facility: HOSPITAL | Age: 57
LOS: 0 days | Discharge: HOME | End: 2022-02-04
Attending: EMERGENCY MEDICINE
Payer: COMMERCIAL

## 2022-02-04 VITALS
SYSTOLIC BLOOD PRESSURE: 163 MMHG | RESPIRATION RATE: 18 BRPM | TEMPERATURE: 98 F | DIASTOLIC BLOOD PRESSURE: 88 MMHG | OXYGEN SATURATION: 100 % | HEART RATE: 78 BPM

## 2022-02-04 VITALS — WEIGHT: 259.93 LBS | HEIGHT: 72 IN
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
## 2022-02-04 DIAGNOSIS — S01.81XA LACERATION WITHOUT FOREIGN BODY OF OTHER PART OF HEAD, INITIAL ENCOUNTER: ICD-10-CM

## 2022-02-04 DIAGNOSIS — S40.022A CONTUSION OF LEFT UPPER ARM, INITIAL ENCOUNTER: ICD-10-CM

## 2022-02-04 DIAGNOSIS — M79.622 PAIN IN LEFT UPPER ARM: ICD-10-CM
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
## 2022-02-04 PROCEDURE — G1004: CPT

## 2022-02-04 PROCEDURE — 12011 RPR F/E/E/N/L/M 2.5 CM/<: CPT

## 2022-02-04 PROCEDURE — 73080 X-RAY EXAM OF ELBOW: CPT | Mod: LT

## 2022-02-04 PROCEDURE — 99284 EMERGENCY DEPT VISIT MOD MDM: CPT | Mod: 25

## 2022-02-04 PROCEDURE — 99285 EMERGENCY DEPT VISIT HI MDM: CPT | Mod: 25

## 2022-02-04 PROCEDURE — 70450 CT HEAD/BRAIN W/O DYE: CPT | Mod: MG

## 2022-02-04 PROCEDURE — 73060 X-RAY EXAM OF HUMERUS: CPT | Mod: LT

## 2022-02-04 PROCEDURE — 73130 X-RAY EXAM OF HAND: CPT | Mod: 26,RT

## 2022-02-04 PROCEDURE — 73130 X-RAY EXAM OF HAND: CPT | Mod: RT

## 2022-02-04 PROCEDURE — 73060 X-RAY EXAM OF HUMERUS: CPT | Mod: 26,LT

## 2022-02-04 PROCEDURE — 73080 X-RAY EXAM OF ELBOW: CPT | Mod: 26,LT

## 2022-02-04 PROCEDURE — 70450 CT HEAD/BRAIN W/O DYE: CPT | Mod: 26,MG

## 2022-02-04 RX ORDER — ACETAMINOPHEN 500 MG
1000 TABLET ORAL ONCE
Refills: 0 | Status: DISCONTINUED | OUTPATIENT
Start: 2022-02-04 | End: 2022-02-04

## 2022-02-04 NOTE — ED ADULT NURSE NOTE - OBJECTIVE STATEMENT
pt arrived to ED c/o pain after skipping on ice and hitting her head. pt denies LOC. pt alert and oriented. no changes in vision, speech, - n/v. skin intact

## 2022-02-04 NOTE — ED STATDOCS - PROGRESS NOTE DETAILS
58 y/o F with no PMh presents with forehead laceration. Pt slipped on ice while in driveway. Fell on head and left arm, now with laceration. tetanus up to date. Denies LOC, AC use. PE: Well appearing. HEENT: +laceration to left forehead through eyebrow. PERRLA, EOMI. Nares patent. No hemotympanum. Cardiac: s1s2, RRR. lungs: CTAB. MSK: No obvious deformity to extremities. Diffuse TTP left UE. Full ROM in all UE joints. 5/5 UE strength bilaterally. Patient ambulatory. A/P: r/o fracture, intracranial injury. Plan for CT head, XR, lac repair, reassess. - Kev Reese PA-C

## 2022-02-04 NOTE — ED STATDOCS - OBJECTIVE STATEMENT
56 y/o female with no pertinent PMHx presents to the ED c/o laceration. Pt states she slipped on ice in the driveway and fell. Pt fell onto her head and left arm, now with a laceration to her left eyebrow region. Also with abrasions to mid-forehead and right hand as well as LUE pain. Denies LOC. PCP: Dr. Collier. 58 y/o female with no pertinent PMHx presents to the ED c/o laceration. Pt states she slipped on ice in the driveway and fell. Pt fell onto her head and left arm, now with a laceration to her left eyebrow region. Also with abrasions to mid-forehead and right hand as well as LUE pain. Denies LOC, n/v/d/abd pain or cp/sob. PCP: Dr. Collier.

## 2022-02-04 NOTE — ED STATDOCS - SKIN, MLM
skin normal color for race, warm, dry. +3 cm laceration to mid left-eyebrow. +Abrasions to left forehead and right thenar eminence. skin normal color for race, warm, dry. approx +3 cm laceration to mid left-eyebrow. +Abrasions to left forehead and right thenar eminence.

## 2022-02-04 NOTE — ED STATDOCS - NSFOLLOWUPINSTRUCTIONS_ED_ALL_ED_FT
USE MOTRIN OR TYLENOL AS NEEDED FOR PAIN. KEEP DRESSING ON FOR 24 HOURS. SUTURES WILL DISSOLVE IN APX 21 DAYS. WOUND WILL BE HEALED IN 1 WEEK. DO NOT IMMERSE WOUND IN WATER FOR 1 WEEK. RINSE CLEAN WITH RUNNING SOAPY WATER DAILY AFTER REMOVAL OF DRESSING. COVIER WITH BACITRACIN AND BANDAID TWICE DAILY.     Laceration    WHAT YOU NEED TO KNOW:    A laceration is an injury to the skin and the soft tissue underneath it. Lacerations happen when you are cut or hit by something. They can happen anywhere on the body.     DISCHARGE INSTRUCTIONS:    Return to the emergency department if:     You have heavy bleeding or bleeding that does not stop after 10 minutes of holding firm, direct pressure over the wound.       Your wound opens up.     Contact your healthcare provider if:     You have a fever or chills.       Your laceration is red, warm, or swollen.      You have red streaks on your skin coming from your wound.      You have white or yellow drainage from the wound that smells bad.      You have pain that gets worse, even after treatment.       You have questions or concerns about your condition or care.     Medicines:     Prescription pain medicine may be given. Ask how to take this medicine safely.       Antibiotics help treat or prevent a bacterial infection.       Take your medicine as directed. Contact your healthcare provider if you think your medicine is not helping or if you have side effects. Tell him or her if you are allergic to any medicine. Keep a list of the medicines, vitamins, and herbs you take. Include the amounts, and when and why you take them. Bring the list or the pill bottles to follow-up visits. Carry your medicine list with you in case of an emergency.    Care for your wound as directed:     Do not get your wound wet until your healthcare provider says it is okay. Do not soak your wound in water. Do not go swimming until your healthcare provider says it is okay. Carefully wash the wound with soap and water. Gently pat the area dry or allow it to air dry.       Change your bandages when they get wet, dirty, or after washing. Apply new, clean bandages as directed. Do not apply elastic bandages or tape too tight. Do not put powders or lotions over your incision.       Apply antibiotic ointment as directed. Your healthcare provider may give you antibiotic ointment to put over your wound if you have stitches. If you have strips of tape over your incision, let them dry up and fall off on their own. If they do not fall off within 14 days, gently remove them. If you have glue over your wound, do not remove or pick at it. If your glue comes off, do not replace it with glue that you have at home.       Check your wound every day for signs of infection such as swelling, redness, or pus.     Self-care:     Apply ice on your wound for 15 to 20 minutes every hour or as directed. Use an ice pack, or put crushed ice in a plastic bag. Cover it with a towel. Ice helps prevent tissue damage and decreases swelling and pain.      Use a splint as directed. A splint will decrease movement and stress on your wound. It may help it heal faster. A splint may be used for lacerations over joints or areas of your body that bend. Ask your healthcare provider how to apply and remove a splint.       Decrease scarring of your wound by applying ointments as directed. Do not apply ointments until your healthcare provider says it is okay. You may need to wait until your wound is healed. Ask which ointment to buy and how often to use it. After your wound is healed, use sunscreen over the area when you are out in the sun. You should do this for at least 6 months to 1 year after your injury.     Follow up with your healthcare provider as directed: You may need to follow up in 24 to 48 hours to have your wound checked for infection. You will need to return in 3 to 14 days if you have stitches or staples so they can be removed. Care for your wound as directed to prevent infection and help it heal. Write down your questions so you remember to ask them during your visits.

## 2022-02-04 NOTE — ED STATDOCS - ATTENDING CONTRIBUTION TO CARE
I, Asad Mckeon, performed the initial face to face bedside interview with this patient regarding history of present illness, review of symptoms and relevant past medical, social and family history.  I completed an independent physical examination.  I was the initial provider who evaluated this patient. I have signed out the follow up of any pending tests (i.e. labs, radiological studies) to the ACP.  I have communicated the patient’s plan of care and disposition with the ACP.  The history, relevant review of systems, past medical and surgical history, medical decision making, and physical examination was documented by the scribe in my presence and I attest to the accuracy of the documentation.    pt in ED c/o head pain s/p slip on ice with fall onto left elbow and then hitting head.   pt denies any LOC< change in vision, n/v/d/abd pain.    no meds taken for pain.   states saw blood from head.   visible approx 3 cm lac vertical over left mid eyebrow.   abrasions to left forehead and right hand.   mild ttp of left elbow and humerus region.   no palpable crepitus or visible deformity.   will CT, XRs, meds and reeval

## 2022-02-04 NOTE — ED STATDOCS - PATIENT PORTAL LINK FT
You can access the FollowMyHealth Patient Portal offered by [facility] by registering at the following website: [email or web address]. By joining [employer]’s FollowMyHealth portal, you will also be able to view your health information using other applications (apps) compatible with our system.

## 2022-02-04 NOTE — ED STATDOCS - MUSCULOSKELETAL, MLM
range of motion is not limited and there is no muscle tenderness. +Mild TTP of LUE. No visible deformity or crepitus.

## 2022-02-08 ENCOUNTER — NON-APPOINTMENT (OUTPATIENT)
Age: 57
End: 2022-02-08

## 2022-02-09 ENCOUNTER — NON-APPOINTMENT (OUTPATIENT)
Age: 57
End: 2022-02-09

## 2022-03-04 ENCOUNTER — NON-APPOINTMENT (OUTPATIENT)
Age: 57
End: 2022-03-04

## 2022-03-04 ENCOUNTER — APPOINTMENT (OUTPATIENT)
Dept: CARDIOLOGY | Facility: CLINIC | Age: 57
End: 2022-03-04
Payer: COMMERCIAL

## 2022-03-04 VITALS
BODY MASS INDEX: 41.37 KG/M2 | HEIGHT: 68 IN | OXYGEN SATURATION: 99 % | WEIGHT: 273 LBS | DIASTOLIC BLOOD PRESSURE: 88 MMHG | SYSTOLIC BLOOD PRESSURE: 142 MMHG | HEART RATE: 97 BPM

## 2022-03-04 VITALS — SYSTOLIC BLOOD PRESSURE: 140 MMHG | DIASTOLIC BLOOD PRESSURE: 80 MMHG

## 2022-03-04 PROCEDURE — 93000 ELECTROCARDIOGRAM COMPLETE: CPT

## 2022-03-04 PROCEDURE — 99244 OFF/OP CNSLTJ NEW/EST MOD 40: CPT

## 2022-03-04 NOTE — REVIEW OF SYSTEMS
[Weight Gain (___ Lbs)] : [unfilled] ~Ulb weight gain [Feeling Fatigued] : feeling fatigued [Dyspnea on exertion] : dyspnea during exertion [Chest Discomfort] : no chest discomfort [Cough] : no cough [Under Stress] : under stress [Negative] : Heme/Lymph

## 2022-03-04 NOTE — HISTORY OF PRESENT ILLNESS
[FreeTextEntry1] : Kailey Callejas is a 57-year-old woman with a history of obesity, COVID-19 infection, hyperlipidemia, who presents for cardiac examination because of exertional dyspnea.\par \par She describes moderate illness from COVID-19 in summer of 2020 -- missed 2 months of work (but did not require hospitalization); has subsequently been experiencing dyspnea with exertion; no associated chest pain or pressure; dyspnea is not present at rest. She has no past history of heart disease. She will be traveling to [place] to spend one month with family later this month.  \par \par * In preparation for today's visit I reviewed recent laboratory data (1/19/2022):  Hyperlipidemia (Total 219, , Trig 145, HDL 44); hemoglobin normal (13).\par

## 2022-03-04 NOTE — DISCUSSION/SUMMARY
Patient last seen in the clinic 12-4-2019 with a follow up scheduled for 6-. Patient no showed to appointment 2-. Refill for Carvedilol was sent to the patients preferred pharmacy.
[FreeTextEntry1] : \par Exertional dyspnea: Today's ECG appears normal; no past history of heart disease; status post COVID-19 infection (2020).  I recommend further evaluation with echocardiography and exercise ECG stress testing -- patient will be going to [place] later this month and would like to return for testing in April (upon her return).\par \par Hyperlipidemia: Moderately elevated total cholesterol and LDL with low HDL; trial of diet, exercise, and weight loss is reasonable.\par \par Elevated blood pressure in the absence of established hypertension: Blood pressure moderately elevated during today's visit (checked multiple times); she tells the blood pressure is usually "much lower."  I recommend continued observation with lifestyle changes.

## 2022-03-04 NOTE — PHYSICAL EXAM
[Obese] : obese [Normal S1, S2] : normal S1, S2 [No Murmur] : no murmur [Clear Lung Fields] : clear lung fields [Soft] : abdomen soft [Normal Bowel Sounds] : normal bowel sounds [Gait - Sufficient for Exercise Testing] : gait - sufficient for exercise testing [No Edema] : no edema [No Rash] : no rash [Moves all extremities] : moves all extremities [Normal Speech] : normal speech [Alert and Oriented] : alert and oriented [de-identified] : Extraocular muscles intact [de-identified] : Wearing a facemask [de-identified] : No JVD [de-identified] : Central obesity

## 2022-04-06 NOTE — ED ADULT NURSE NOTE - PAIN RATING/NUMBER SCALE (0-10): REST
Physical Therapy Discharge Summary Addendum:  Date: 4/6/2022  Total Number of Visits: 8  Referred by: Peggy Wayne MD  Medical Diagnosis (from order):   Diagnosis Information      Diagnosis    724.2, 338.29 (ICD-9-CM) - M54.50, G89.29 (ICD-10-CM) - Chronic midline low back pain without sciatica                Patient was last seen on 3/3/2022.  Patient was doing well at that time.  Patient chose to continue with independent home exercise program.  Patient has not contacted department regarding further therapy.  Therefore, Patient will be formally discharged at this time.  Status of goals: per status in last daily treatment note  Outcome Measures:   Lower Extremity Functional Scale: LEFS Calculated Total: 71   (0=extreme difficulty; 80=no difficulty) see flowsheet for additional documentation      HOOS, KOOS, LEFS, NDI, Oswestry, QDASH enter via Test-Outcome and rehab smart/dot phrase     8

## 2022-04-26 ENCOUNTER — APPOINTMENT (OUTPATIENT)
Dept: CARDIOLOGY | Facility: CLINIC | Age: 57
End: 2022-04-26
Payer: COMMERCIAL

## 2022-04-26 PROCEDURE — 93306 TTE W/DOPPLER COMPLETE: CPT

## 2022-04-26 PROCEDURE — 93015 CV STRESS TEST SUPVJ I&R: CPT

## 2022-08-15 NOTE — ED PROVIDER NOTE - NS ED MD EM SELECTION
Writer called and left patient a message that she missed her PAT appointment and must have the PAT phone call or the procedure will be cancelled. José Luisr asked her to call back. [phone] Comprehensive

## 2023-06-06 ENCOUNTER — NON-APPOINTMENT (OUTPATIENT)
Age: 58
End: 2023-06-06

## 2023-06-06 ENCOUNTER — APPOINTMENT (OUTPATIENT)
Dept: INTERNAL MEDICINE | Facility: CLINIC | Age: 58
End: 2023-06-06
Payer: COMMERCIAL

## 2023-06-06 VITALS
WEIGHT: 252 LBS | DIASTOLIC BLOOD PRESSURE: 64 MMHG | HEIGHT: 68 IN | BODY MASS INDEX: 38.19 KG/M2 | OXYGEN SATURATION: 99 % | HEART RATE: 82 BPM | SYSTOLIC BLOOD PRESSURE: 118 MMHG | TEMPERATURE: 97.9 F

## 2023-06-06 DIAGNOSIS — R06.09 OTHER FORMS OF DYSPNEA: ICD-10-CM

## 2023-06-06 DIAGNOSIS — U07.1 COVID-19: ICD-10-CM

## 2023-06-06 DIAGNOSIS — K63.5 POLYP OF COLON: ICD-10-CM

## 2023-06-06 DIAGNOSIS — Z00.00 ENCOUNTER FOR GENERAL ADULT MEDICAL EXAMINATION W/OUT ABNORMAL FINDINGS: ICD-10-CM

## 2023-06-06 DIAGNOSIS — E78.5 HYPERLIPIDEMIA, UNSPECIFIED: ICD-10-CM

## 2023-06-06 PROCEDURE — 93000 ELECTROCARDIOGRAM COMPLETE: CPT

## 2023-06-06 PROCEDURE — 99396 PREV VISIT EST AGE 40-64: CPT | Mod: 25

## 2023-06-06 PROCEDURE — 36415 COLL VENOUS BLD VENIPUNCTURE: CPT

## 2023-06-06 RX ORDER — ERGOCALCIFEROL 1.25 MG/1
1.25 MG CAPSULE ORAL
Qty: 12 | Refills: 0 | Status: DISCONTINUED | COMMUNITY
Start: 2022-01-20 | End: 2023-06-06

## 2023-06-06 NOTE — HEALTH RISK ASSESSMENT
[Yes] : Yes [Monthly or less (1 pt)] : Monthly or less (1 point) [1 or 2 (0 pts)] : 1 or 2 (0 points) [Never (0 pts)] : Never (0 points) [No] : In the past 12 months have you used drugs other than those required for medical reasons? No [0] : 2) Feeling down, depressed, or hopeless: Not at all (0) [Never] : Never [No falls in past year] : Patient reported no falls in the past year [PHQ-2 Negative - No further assessment needed] : PHQ-2 Negative - No further assessment needed [With Family] : lives with family [] :  [Fully functional (bathing, dressing, toileting, transferring, walking, feeding)] : Fully functional (bathing, dressing, toileting, transferring, walking, feeding) [Fully functional (using the telephone, shopping, preparing meals, housekeeping, doing laundry, using] : Fully functional and needs no help or supervision to perform IADLs (using the telephone, shopping, preparing meals, housekeeping, doing laundry, using transportation, managing medications and managing finances) [Smoke Detector] : smoke detector [Carbon Monoxide Detector] : carbon monoxide detector [Seat Belt] :  uses seat belt [Sunscreen] : uses sunscreen [With Patient/Caregiver] : , with patient/caregiver [Patient reported mammogram was normal] : Patient reported mammogram was normal [Patient reported PAP Smear was normal] : Patient reported PAP Smear was normal [de-identified] : mike [de-identified] : calories [CHQ3Oisri] : 0 [EyeExamDate] : 1/1/23 [Change in mental status noted] : No change in mental status noted [MammogramDate] : 12/1/22 [PapSmearDate] : 1/1/20 [AdvancecareDate] : 6/6/23

## 2023-06-07 ENCOUNTER — NON-APPOINTMENT (OUTPATIENT)
Age: 58
End: 2023-06-07

## 2023-06-07 LAB
25(OH)D3 SERPL-MCNC: 32.3 NG/ML
ALBUMIN SERPL ELPH-MCNC: 4.2 G/DL
ALP BLD-CCNC: 114 U/L
ALT SERPL-CCNC: 22 U/L
AMYLASE/CREAT SERPL: 61 U/L
ANION GAP SERPL CALC-SCNC: 9 MMOL/L
APPEARANCE: CLEAR
AST SERPL-CCNC: 17 U/L
BACTERIA: NEGATIVE /HPF
BILIRUB DIRECT SERPL-MCNC: 0.1 MG/DL
BILIRUB SERPL-MCNC: 0.4 MG/DL
BILIRUBIN URINE: NEGATIVE
BLOOD URINE: NEGATIVE
BUN SERPL-MCNC: 14 MG/DL
CALCIUM SERPL-MCNC: 9.5 MG/DL
CAST: 0 /LPF
CHLORIDE SERPL-SCNC: 107 MMOL/L
CHOLEST SERPL-MCNC: 212 MG/DL
CO2 SERPL-SCNC: 26 MMOL/L
COLOR: YELLOW
CREAT SERPL-MCNC: 0.75 MG/DL
EGFR: 91 ML/MIN/1.73M2
EPITHELIAL CELLS: 1 /HPF
ESTIMATED AVERAGE GLUCOSE: 103 MG/DL
FERRITIN SERPL-MCNC: 114 NG/ML
GLUCOSE QUALITATIVE U: NEGATIVE MG/DL
GLUCOSE SERPL-MCNC: 94 MG/DL
HBA1C MFR BLD HPLC: 5.2 %
HCV AB SER QL: NONREACTIVE
HCV S/CO RATIO: 0.2 S/CO
HDLC SERPL-MCNC: 47 MG/DL
IRON SERPL-MCNC: 56 UG/DL
KETONES URINE: NEGATIVE MG/DL
LDLC SERPL CALC-MCNC: 131 MG/DL
LEUKOCYTE ESTERASE URINE: ABNORMAL
LPL SERPL-CCNC: 14 U/L
MICROSCOPIC-UA: NORMAL
NITRITE URINE: NEGATIVE
NONHDLC SERPL-MCNC: 165 MG/DL
PH URINE: 6
POTASSIUM SERPL-SCNC: 4.8 MMOL/L
PROT SERPL-MCNC: 7.2 G/DL
PROTEIN URINE: NEGATIVE MG/DL
RED BLOOD CELLS URINE: 0 /HPF
SODIUM SERPL-SCNC: 143 MMOL/L
SPECIFIC GRAVITY URINE: 1.01
T4 SERPL-MCNC: 7.9 UG/DL
TRIGL SERPL-MCNC: 171 MG/DL
TSH SERPL-ACNC: 1.15 UIU/ML
UROBILINOGEN URINE: 0.2 MG/DL
WHITE BLOOD CELLS URINE: 0 /HPF

## 2023-08-24 ENCOUNTER — NON-APPOINTMENT (OUTPATIENT)
Age: 58
End: 2023-08-24

## 2023-10-13 ENCOUNTER — APPOINTMENT (OUTPATIENT)
Dept: FAMILY MEDICINE | Facility: CLINIC | Age: 58
End: 2023-10-13
Payer: COMMERCIAL

## 2023-10-13 VITALS
SYSTOLIC BLOOD PRESSURE: 130 MMHG | WEIGHT: 265 LBS | HEART RATE: 75 BPM | DIASTOLIC BLOOD PRESSURE: 76 MMHG | OXYGEN SATURATION: 98 % | TEMPERATURE: 98.2 F | BODY MASS INDEX: 40.16 KG/M2 | HEIGHT: 68 IN

## 2023-10-13 DIAGNOSIS — J06.9 ACUTE UPPER RESPIRATORY INFECTION, UNSPECIFIED: ICD-10-CM

## 2023-10-13 PROCEDURE — 99214 OFFICE O/P EST MOD 30 MIN: CPT

## 2023-12-01 ENCOUNTER — LABORATORY RESULT (OUTPATIENT)
Age: 58
End: 2023-12-01

## 2023-12-01 ENCOUNTER — APPOINTMENT (OUTPATIENT)
Dept: RHEUMATOLOGY | Facility: CLINIC | Age: 58
End: 2023-12-01
Payer: COMMERCIAL

## 2023-12-01 VITALS
BODY MASS INDEX: 41.37 KG/M2 | OXYGEN SATURATION: 97 % | TEMPERATURE: 96.6 F | WEIGHT: 273 LBS | DIASTOLIC BLOOD PRESSURE: 84 MMHG | SYSTOLIC BLOOD PRESSURE: 154 MMHG | HEART RATE: 82 BPM | HEIGHT: 68 IN

## 2023-12-01 DIAGNOSIS — M25.50 PAIN IN UNSPECIFIED JOINT: ICD-10-CM

## 2023-12-01 DIAGNOSIS — M25.551 PAIN IN RIGHT HIP: ICD-10-CM

## 2023-12-01 PROCEDURE — 20610 DRAIN/INJ JOINT/BURSA W/O US: CPT | Mod: RT

## 2023-12-01 PROCEDURE — 99205 OFFICE O/P NEW HI 60 MIN: CPT | Mod: 25

## 2023-12-01 RX ORDER — TRIAMCINOLONE ACETONIDE 80 MG/ML
80 INJECTION, SUSPENSION INTRA-ARTICULAR; INTRAMUSCULAR
Qty: 8 | Refills: 0 | Status: COMPLETED | OUTPATIENT
Start: 2023-12-01

## 2023-12-01 RX ORDER — ALBUTEROL SULFATE 90 UG/1
108 (90 BASE) INHALANT RESPIRATORY (INHALATION)
Qty: 1 | Refills: 0 | Status: DISCONTINUED | COMMUNITY
Start: 2023-10-13 | End: 2023-12-01

## 2023-12-01 RX ORDER — AZITHROMYCIN 250 MG/1
250 TABLET, FILM COATED ORAL
Qty: 1 | Refills: 0 | Status: DISCONTINUED | COMMUNITY
Start: 2023-10-13 | End: 2023-12-01

## 2023-12-01 RX ADMIN — TRIAMCINOLONE ACETONIDE 0 MG/ML: 80 INJECTION, SUSPENSION INTRA-ARTICULAR; INTRAMUSCULAR at 00:00

## 2023-12-04 LAB
ALBUMIN SERPL ELPH-MCNC: 4.2 G/DL
ALP BLD-CCNC: 118 U/L
ALT SERPL-CCNC: 24 U/L
ANA SER IF-ACNC: NEGATIVE
ANION GAP SERPL CALC-SCNC: 15 MMOL/L
AST SERPL-CCNC: 16 U/L
BASOPHILS # BLD AUTO: 0.03 K/UL
BASOPHILS NFR BLD AUTO: 0.4 %
BILIRUB SERPL-MCNC: 0.3 MG/DL
BUN SERPL-MCNC: 19 MG/DL
CALCIUM SERPL-MCNC: 9.4 MG/DL
CCP AB SER IA-ACNC: <8 UNITS
CHLORIDE SERPL-SCNC: 105 MMOL/L
CO2 SERPL-SCNC: 24 MMOL/L
CREAT SERPL-MCNC: 0.71 MG/DL
CRP SERPL-MCNC: 7 MG/L
EGFR: 97 ML/MIN/1.73M2
EOSINOPHIL # BLD AUTO: 0.14 K/UL
EOSINOPHIL NFR BLD AUTO: 2 %
ERYTHROCYTE [SEDIMENTATION RATE] IN BLOOD BY WESTERGREN METHOD: 30 MM/HR
GLUCOSE SERPL-MCNC: 119 MG/DL
HCT VFR BLD CALC: 38.3 %
HGB BLD-MCNC: 12.7 G/DL
IMM GRANULOCYTES NFR BLD AUTO: 0.6 %
LYMPHOCYTES # BLD AUTO: 2.01 K/UL
LYMPHOCYTES NFR BLD AUTO: 28.1 %
MAN DIFF?: NORMAL
MCHC RBC-ENTMCNC: 28 PG
MCHC RBC-ENTMCNC: 33.2 GM/DL
MCV RBC AUTO: 84.4 FL
MONOCYTES # BLD AUTO: 0.53 K/UL
MONOCYTES NFR BLD AUTO: 7.4 %
NEUTROPHILS # BLD AUTO: 4.41 K/UL
NEUTROPHILS NFR BLD AUTO: 61.5 %
PLATELET # BLD AUTO: 242 K/UL
POTASSIUM SERPL-SCNC: 4 MMOL/L
PROT SERPL-MCNC: 7.3 G/DL
RBC # BLD: 4.54 M/UL
RBC # FLD: 14.3 %
RF+CCP IGG SER-IMP: NEGATIVE
RHEUMATOID FACT SER QL: <10 IU/ML
SODIUM SERPL-SCNC: 144 MMOL/L
WBC # FLD AUTO: 7.16 K/UL

## 2023-12-05 LAB
ALBUMIN MFR SERPL ELPH: 58.2 %
ALBUMIN SERPL-MCNC: 4.1 G/DL
ALBUMIN/GLOB SERPL: 1.4 RATIO
ALPHA1 GLOB MFR SERPL ELPH: 4 %
ALPHA1 GLOB SERPL ELPH-MCNC: 0.3 G/DL
ALPHA2 GLOB MFR SERPL ELPH: 7.9 %
ALPHA2 GLOB SERPL ELPH-MCNC: 0.6 G/DL
B-GLOBULIN MFR SERPL ELPH: 11.5 %
B-GLOBULIN SERPL ELPH-MCNC: 0.8 G/DL
GAMMA GLOB FLD ELPH-MCNC: 1.3 G/DL
GAMMA GLOB MFR SERPL ELPH: 18.4 %
INTERPRETATION SERPL IEP-IMP: NORMAL
M PROTEIN SPEC IFE-MCNC: NORMAL
PROT SERPL-MCNC: 7.1 G/DL
PROT SERPL-MCNC: 7.1 G/DL

## 2023-12-08 LAB — G6PD SER-CCNC: 15 U/G HGB

## 2023-12-13 ENCOUNTER — APPOINTMENT (OUTPATIENT)
Dept: ULTRASOUND IMAGING | Facility: CLINIC | Age: 58
End: 2023-12-13
Payer: COMMERCIAL

## 2023-12-13 ENCOUNTER — OUTPATIENT (OUTPATIENT)
Dept: OUTPATIENT SERVICES | Facility: HOSPITAL | Age: 58
LOS: 1 days | End: 2023-12-13
Payer: COMMERCIAL

## 2023-12-13 DIAGNOSIS — M25.50 PAIN IN UNSPECIFIED JOINT: ICD-10-CM

## 2023-12-13 DIAGNOSIS — M25.551 PAIN IN RIGHT HIP: ICD-10-CM

## 2023-12-13 PROCEDURE — 76881 US COMPL JOINT R-T W/IMG: CPT | Mod: 26,LT

## 2023-12-13 PROCEDURE — 76881 US COMPL JOINT R-T W/IMG: CPT

## 2023-12-26 ENCOUNTER — NON-APPOINTMENT (OUTPATIENT)
Age: 58
End: 2023-12-26

## 2023-12-26 ENCOUNTER — APPOINTMENT (OUTPATIENT)
Dept: BARIATRICS | Facility: CLINIC | Age: 58
End: 2023-12-26
Payer: COMMERCIAL

## 2023-12-26 VITALS
TEMPERATURE: 96.7 F | HEIGHT: 68 IN | HEART RATE: 72 BPM | BODY MASS INDEX: 41.07 KG/M2 | OXYGEN SATURATION: 98 % | DIASTOLIC BLOOD PRESSURE: 82 MMHG | SYSTOLIC BLOOD PRESSURE: 140 MMHG | WEIGHT: 271 LBS

## 2023-12-26 DIAGNOSIS — M19.90 UNSPECIFIED OSTEOARTHRITIS, UNSPECIFIED SITE: ICD-10-CM

## 2023-12-26 DIAGNOSIS — R63.5 ABNORMAL WEIGHT GAIN: ICD-10-CM

## 2023-12-26 PROCEDURE — 99205 OFFICE O/P NEW HI 60 MIN: CPT

## 2023-12-26 RX ORDER — PREDNISONE 5 MG/1
5 TABLET ORAL
Qty: 30 | Refills: 0 | Status: DISCONTINUED | COMMUNITY
Start: 2023-12-01 | End: 2023-12-26

## 2023-12-26 NOTE — REVIEW OF SYSTEMS
[Patient Intake Form Reviewed] : Patient intake form was reviewed [Fever] : no fever [Chills] : no chills [Night Sweats] : no night sweats [Fatigue] : no fatigue [Recent Change In Weight] : ~T recent weight change [Joint Pain] : no joint pain [Joint Stiffness] : no joint stiffness [Muscle Pain] : no muscle pain [Muscle Weakness] : no muscle weakness [Negative] : Allergic/Immunologic [FreeTextEntry2] : weight gain [FreeTextEntry9] : joint swelling

## 2023-12-26 NOTE — ASSESSMENT
[FreeTextEntry1] : SONYA LEVIN is a 58 year-old F with a long-standing h/o obesity, who presents today for initial evaluation for weight management options.   I had a lengthy discussion with the patient regarding nutrition, exercise, weight loss medications, and bariatric surgery. The patient qualifies for bariatric surgery but is not interested at this time. We reviewed surgical options such as the gastric RNY bypass and sleeve gastrectomy and the risks and benefits. We discuss how bariatric surgery may offer greater weight loss results with better long-term success. The patient qualifies for and is most interested in weight loss medications.   Health maintenance and behavioral/nutrition counseling for obesity: An additional 30 minutes of the visit was spent counseling the patient regarding need for aggressive weight loss and behavior modification including nutrition and proper eating habits, including which foods to eat and avoid.   I emphasized the importance of making healthier food choices including fresh fruits and vegetables, lean meats, and protein sources. I recommended front loading calories, incorporating whole grains, and eliminating fast foods. I also discussed the importance of avoiding fried/fatty foods and foods containing high sugar content including juices/shakes/sodas/desserts.   I also encouraged beginning an exercise program and recommended cardiovascular exercises along with strength training to build lean muscle. I made suggestions on different types of exercises to try.   Patient will work on the following:  -Meet with nutritionist  -Eliminate snacks  -Focus on eating 3 well-balanced meals during the daytime with appropriate portion size  -Cooking fresh meals rather than take out/processed/ready-made foods  -Incorporating exercise  -Obtain bloodwork   I have discussed initiating Wegovy therapy for pt. Currently there are no contraindications for the use of Wegovy after reviewing pts medical history and labs including personal or family history of thyroid cancer or MEN2. Possible side effects were discussed with the patient including nausea, reflux, constipation, delayed gastric emptying, or pancreatitis. Pt verbalizes understanding and wishes to proceed with medical therapy. Instructions for use provided.   Start Wegovy 0.25mg weekly based on authorization and labs.    Patient educated to call with questions/concerns All questions answered Return to office 1 month after starting Wegovy   Additional time spent before and after visit reviewing chart.

## 2023-12-26 NOTE — HISTORY OF PRESENT ILLNESS
[de-identified] : SONYA LEVIN is a 58 year old F with a long-standing h/o obesity, who presents today for initial evaluation for weight management options. Patient has tried diet and exercise programs without long-term success. Patient was prescribed phentermine last year and did well losing 40lb, but was not able to continue seeing that provider. She was briefly started on Wegovy 0.25mg for 1month this year but discontinued due to supply shortages. Patient is interested in re-starting medications.    Reports no history of substance abuse, significant anxiety, uncontrolled blood pressure, kidney stones, or pancreatitis. Reports no family history of thyroid cancer or MEN 2 syndrome.   Heaviest/current wt 270 lbs, lowest wt 163 lbs. Goal weight: 190 lbs Diets/exercise programs tried in the past: diet programs with a nutritionist Weight loss medications tried in the past: phentermine, wegovy Reason for stopping weight loss medication if applicable: phentermine- unable to continue seeing provider, Wegovy- supply shortage   History of bariatric surgery: N Obesity comorbidities: pre-DM Comorbidities improved/resolved: n/a Anti-obesity medication: none Obesity medication side effects: n/a   Current dietary lifestyle: Breakfast: coffee, bagel, yogurt Lunch: salad + chicken, hamburger + fries Dinner: chicken or beef, vegetables Snacks: apple, cookies Drinks: water, diet soda sometimes   Activity Lifestyle:  Sleep: 3-4hrs Physical activity/exercise: stopped pilates Work: Medical records Lives with:  and son Smoking/ETOH: no smoking, social drinking   Obstacles to losing weight: inconsistent routine

## 2023-12-26 NOTE — PHYSICAL EXAM
[Obese] : obese [Normal] : affect appropriate [de-identified] : Well, healthy-appearing adult [de-identified] : Soft, NT, ND, no diastasis or hernias appreciated [de-identified] : No audible carotid bruit or JVD appreciated [de-identified] : MICHELLE

## 2023-12-28 LAB
CHOLEST SERPL-MCNC: 230 MG/DL
ESTIMATED AVERAGE GLUCOSE: 103 MG/DL
HBA1C MFR BLD HPLC: 5.2 %
HDLC SERPL-MCNC: 47 MG/DL
INSULIN P FAST SERPL-ACNC: 27 UU/ML
LDLC SERPL CALC-MCNC: 160 MG/DL
NONHDLC SERPL-MCNC: 182 MG/DL
TRIGL SERPL-MCNC: 122 MG/DL
TSH SERPL-ACNC: 1.73 UIU/ML

## 2024-01-02 ENCOUNTER — APPOINTMENT (OUTPATIENT)
Dept: RADIOLOGY | Facility: CLINIC | Age: 59
End: 2024-01-02
Payer: COMMERCIAL

## 2024-01-02 ENCOUNTER — APPOINTMENT (OUTPATIENT)
Dept: INTERNAL MEDICINE | Facility: CLINIC | Age: 59
End: 2024-01-02
Payer: COMMERCIAL

## 2024-01-02 ENCOUNTER — OUTPATIENT (OUTPATIENT)
Dept: OUTPATIENT SERVICES | Facility: HOSPITAL | Age: 59
LOS: 1 days | End: 2024-01-02
Payer: COMMERCIAL

## 2024-01-02 VITALS
SYSTOLIC BLOOD PRESSURE: 132 MMHG | TEMPERATURE: 97.7 F | HEART RATE: 76 BPM | HEIGHT: 68 IN | OXYGEN SATURATION: 98 % | WEIGHT: 275 LBS | BODY MASS INDEX: 41.68 KG/M2 | DIASTOLIC BLOOD PRESSURE: 76 MMHG

## 2024-01-02 DIAGNOSIS — J06.9 ACUTE UPPER RESPIRATORY INFECTION, UNSPECIFIED: ICD-10-CM

## 2024-01-02 DIAGNOSIS — R05.9 COUGH, UNSPECIFIED: ICD-10-CM

## 2024-01-02 PROCEDURE — 71046 X-RAY EXAM CHEST 2 VIEWS: CPT

## 2024-01-02 PROCEDURE — 71046 X-RAY EXAM CHEST 2 VIEWS: CPT | Mod: 26

## 2024-01-02 PROCEDURE — 99213 OFFICE O/P EST LOW 20 MIN: CPT

## 2024-01-02 NOTE — HISTORY OF PRESENT ILLNESS
[FreeTextEntry8] : Ms. SONYA LEVIN is a 58 year F who comes in for an acute visit. Pt notes feeling headache, body aches, chills and dry cough since 12/25/23. She went to  on 12/27 and had negative covid and flu testing and advised to take nasal spray and robitussin as needed. Pt continues to have dry cough. 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  Patient denies any cp, sob, abdominal pain, nausea, vomiting, palpitations, fever, chills, constipation, diarrhea.

## 2024-01-02 NOTE — PHYSICAL EXAM
[TextEntry] : General: NAD HEENT: NC/AT, EOMI, PERRLA. Neck: supple, no JVD. no LAD. CVS: S1, S2 normal, RRR, no m/g/r Resp: CTA b/l, no wheeze/rale/rhonchi Extremities: no edema Neuro: aaox3

## 2024-01-02 NOTE — ASSESSMENT
[FreeTextEntry1] : 1.upper respiratory infection: With continued cough.  Discussed starting on Tessalon Perles and Medrol Dosepak.  Obtain chest x-ray. Went over instructions and side effects of medication. Increase fluids, rest.  Call for new or worsening symptoms.  Will relay above to PCP.

## 2024-01-12 ENCOUNTER — APPOINTMENT (OUTPATIENT)
Dept: INTERNAL MEDICINE | Facility: CLINIC | Age: 59
End: 2024-01-12

## 2024-01-29 ENCOUNTER — APPOINTMENT (OUTPATIENT)
Dept: BARIATRICS | Facility: CLINIC | Age: 59
End: 2024-01-29
Payer: COMMERCIAL

## 2024-01-29 VITALS
SYSTOLIC BLOOD PRESSURE: 128 MMHG | OXYGEN SATURATION: 98 % | HEART RATE: 81 BPM | DIASTOLIC BLOOD PRESSURE: 74 MMHG | TEMPERATURE: 96.7 F | WEIGHT: 271 LBS | HEIGHT: 68 IN | BODY MASS INDEX: 41.07 KG/M2

## 2024-01-29 PROCEDURE — 99214 OFFICE O/P EST MOD 30 MIN: CPT

## 2024-01-29 RX ORDER — PHENTERMINE HYDROCHLORIDE 37.5 MG/1
37.5 TABLET ORAL
Qty: 30 | Refills: 0 | Status: COMPLETED | COMMUNITY
Start: 2024-01-05 | End: 2024-02-28

## 2024-01-29 NOTE — PHYSICAL EXAM
[Obese, well nourished, in no acute distress] : obese, well nourished, in no acute distress [Normal] : affect appropriate [de-identified] : Equal chest rise, non-labored respirations, no audible wheezing. [de-identified] : MICHELLE [de-identified] : soft, NT, ND, no umbilical hernia or diastasis appreciated

## 2024-01-29 NOTE — HISTORY OF PRESENT ILLNESS
[de-identified] : 59 year old F with a longstanding history of obesity presents for a weight-loss medication follow-up after having made nutrition and exercise changes since last visit. Weight stable since last visit 1 month ago. Reports no abdominal pain, nausea/vomiting, constipation, diarrhea, reflux/heartburn. States she took the full tab of phentermine instead of 1/2 tab accidentally, therefore only took 14 days. She reported headaches within the first couple of days of starting phentermine, but it subsided. Wegovy has been approved, but she has been unable to find it. Zepbound needs a prior authorization.    Patient trying to eat 3 protein-rich meals/day (B: egg white, flagel, yogurt, banana, coffee either black or with almond milk, L: salad with grilled chicken, D: pasta or vegetable with meat or fish) and snacking on cheese, turkey, pepperoni, fruit, popcorn, drinking adequate zero-calorie fluids/day, and not exercising; sleeps 4-5 hrs/night. Pt is interested in continuing dose of medication and possibly starting on injectables if/when available.   Starting weight at initial consult: 271 Current weight at today's visit: 271   Anti-obesity medication(s): phentermine Start date: 1/5/24 Current dose: 37.5 mg Side-effects from anti-obesity medication(s): headache, subsided Obesity comorbidities: HLD Comorbidities improved/resolved: n/a History of bariatric surgery: no   Next nutritionist appointment 3/8/24

## 2024-01-30 ENCOUNTER — APPOINTMENT (OUTPATIENT)
Dept: INTERNAL MEDICINE | Facility: CLINIC | Age: 59
End: 2024-01-30
Payer: COMMERCIAL

## 2024-01-30 ENCOUNTER — APPOINTMENT (OUTPATIENT)
Dept: RHEUMATOLOGY | Facility: CLINIC | Age: 59
End: 2024-01-30
Payer: COMMERCIAL

## 2024-01-30 VITALS
HEIGHT: 68 IN | DIASTOLIC BLOOD PRESSURE: 70 MMHG | BODY MASS INDEX: 40.92 KG/M2 | SYSTOLIC BLOOD PRESSURE: 130 MMHG | WEIGHT: 270 LBS | OXYGEN SATURATION: 97 % | HEART RATE: 96 BPM | TEMPERATURE: 97.6 F

## 2024-01-30 VITALS
WEIGHT: 270 LBS | SYSTOLIC BLOOD PRESSURE: 120 MMHG | DIASTOLIC BLOOD PRESSURE: 78 MMHG | OXYGEN SATURATION: 97 % | BODY MASS INDEX: 40.92 KG/M2 | TEMPERATURE: 98.4 F | HEIGHT: 68 IN | HEART RATE: 97 BPM

## 2024-01-30 DIAGNOSIS — J06.9 ACUTE UPPER RESPIRATORY INFECTION, UNSPECIFIED: ICD-10-CM

## 2024-01-30 PROCEDURE — 99214 OFFICE O/P EST MOD 30 MIN: CPT

## 2024-01-30 RX ORDER — METHYLPREDNISOLONE 4 MG/1
4 TABLET ORAL
Qty: 1 | Refills: 0 | Status: DISCONTINUED | COMMUNITY
Start: 2024-01-02 | End: 2024-01-30

## 2024-01-30 RX ORDER — METHYLPREDNISOLONE 4 MG/1
4 TABLET ORAL
Qty: 1 | Refills: 0 | Status: DISCONTINUED | COMMUNITY
Start: 2024-01-30 | End: 2024-01-30

## 2024-01-30 RX ORDER — AZITHROMYCIN 250 MG/1
250 TABLET, FILM COATED ORAL
Qty: 1 | Refills: 0 | Status: DISCONTINUED | COMMUNITY
Start: 2024-01-30 | End: 2024-01-30

## 2024-01-30 RX ORDER — ALBUTEROL SULFATE 90 UG/1
108 (90 BASE) INHALANT RESPIRATORY (INHALATION)
Qty: 1 | Refills: 1 | Status: DISCONTINUED | COMMUNITY
Start: 2024-01-30 | End: 2024-01-30

## 2024-01-30 RX ORDER — SEMAGLUTIDE 0.25 MG/.5ML
0.25 INJECTION, SOLUTION SUBCUTANEOUS
Qty: 1 | Refills: 0 | Status: DISCONTINUED | COMMUNITY
Start: 2023-06-06 | End: 2024-01-30

## 2024-01-30 RX ORDER — TIRZEPATIDE 2.5 MG/.5ML
2.5 INJECTION, SOLUTION SUBCUTANEOUS
Qty: 1 | Refills: 0 | Status: DISCONTINUED | COMMUNITY
Start: 2024-01-08 | End: 2024-01-30

## 2024-01-30 RX ORDER — BENZONATATE 200 MG/1
200 CAPSULE ORAL 3 TIMES DAILY
Qty: 30 | Refills: 0 | Status: DISCONTINUED | COMMUNITY
Start: 2024-01-02 | End: 2024-01-30

## 2024-01-30 RX ORDER — INHALER, ASSIST DEVICES
SPACER (EA) MISCELLANEOUS
Qty: 1 | Refills: 0 | Status: DISCONTINUED | COMMUNITY
Start: 2024-01-30 | End: 2024-01-30

## 2024-01-30 NOTE — HISTORY OF PRESENT ILLNESS
[FreeTextEntry8] : Pt here with cough  over one month.   She feels   about  4 days  ago the cough  felt much worse. She  then developed body aches.   Has  had chills and sweats over the last few days has been taking tylenol and advil.  Appetite has been diminshed. Has gone to  work  but very difficult if she could   she would lay in bed. Had been seen over one month ago and had similar sx all of the sx  went away  except for the cough. Has  now started  to feel short of breath.   Her  has an illness.   Her daughter is visiting   from [place]   and is sick with her   baby as well.

## 2024-01-31 LAB
RAPID RVP RESULT: NOT DETECTED
SARS-COV-2 RNA PNL RESP NAA+PROBE: NOT DETECTED

## 2024-02-20 ENCOUNTER — APPOINTMENT (OUTPATIENT)
Dept: BARIATRICS | Facility: CLINIC | Age: 59
End: 2024-02-20

## 2024-03-08 ENCOUNTER — APPOINTMENT (OUTPATIENT)
Dept: BARIATRICS/WEIGHT MGMT | Facility: CLINIC | Age: 59
End: 2024-03-08

## 2024-03-19 ENCOUNTER — NON-APPOINTMENT (OUTPATIENT)
Age: 59
End: 2024-03-19

## 2024-03-21 ENCOUNTER — APPOINTMENT (OUTPATIENT)
Dept: SURGERY | Facility: CLINIC | Age: 59
End: 2024-03-21
Payer: COMMERCIAL

## 2024-03-21 VITALS — BODY MASS INDEX: 40.92 KG/M2 | WEIGHT: 270 LBS | HEIGHT: 68 IN

## 2024-03-21 PROCEDURE — 99204 OFFICE O/P NEW MOD 45 MIN: CPT

## 2024-03-21 NOTE — PHYSICAL EXAM
[Obese] : obese [Normal] : affect appropriate [de-identified] : Current BMI 41.  Limited due to telehealth visit. No

## 2024-03-21 NOTE — HISTORY OF PRESENT ILLNESS
[FreeTextEntry1] : Kailey is a 59-year-old female who presents for initial obesity medicine consultation.  Patient stands 5 foot 8 inches tall and weighs 270 pounds with a BMI of 41.  She presents to discuss obesity medicine options at today's visit.  Patient has been slowly gaining weight since menopause several years ago.  She tries to increase her physical activity and "watch what she eats" however she seems to be gaining weight.  I discussed with her that her best option at this point due to medication shortages would indeed be bariatric surgery such as the sleeve gastrectomy.  She was very hesitant about bariatric surgery as she claims to be "very fearful of surgery as she has seen many complications".  I explained to her that this surgery is extremely safe and effective however she wishes to try to obtain authorization for a GLP-1 injection at this point.  I explained to patient there are nationwide widespread shortages of all medications however I will try to obtain authorization for Wegovy as this may be slightly more available than alternate medications.  [facility] and authorization request sent to [facility].  I will contact patient once a determination has been reached and I will follow-up with her every 2 months.  Patient agrees with this plan.

## 2024-03-21 NOTE — ASSESSMENT
[FreeTextEntry1] : In summary patient is a pleasant 59-year-old female with a 41 BMI seeking obesity medicine consultation.  We had a long discussion and I highly recommended patient seeing a bariatric surgeon as she meets criteria for bariatric surgery however patient is hesitant and fearful at this time.  Patient would prefer trying a GLP-1 injection first however due to national widespread shortages it may be difficult to obtain.  I will try to obtain authorization for Wegovy and follow-up with the patient every 2 months.  I discussed her contacting me in the office monthly to discuss dose escalation.  In the interim patient will increase her physical activity as tolerated and work on calorie reduction.  Patient verbalized understanding of plan.  I have discussed initiating Wegovy therapy for Kailey.  All risks and benefits have been discussed with the patient. Risks include but are not limited to nausea, vomiting, constipation, diarrhea, and Gi upset. Contraindications include Pancreatitis, MENS type 2 and  medullary thyroid cancer, and pregnancy. Pt. verbalize understanding and wishes to proceed with medical therapy. Instructions for use provided via office demonstration.

## 2024-04-09 LAB
ALBUMIN SERPL ELPH-MCNC: 4.1 G/DL
ALP BLD-CCNC: 130 U/L
ALT SERPL-CCNC: 29 U/L
ANION GAP SERPL CALC-SCNC: 11 MMOL/L
AST SERPL-CCNC: 17 U/L
BASOPHILS # BLD AUTO: 0.03 K/UL
BASOPHILS NFR BLD AUTO: 0.4 %
BILIRUB SERPL-MCNC: 0.4 MG/DL
BUN SERPL-MCNC: 17 MG/DL
CALCIUM SERPL-MCNC: 9.6 MG/DL
CHLORIDE SERPL-SCNC: 106 MMOL/L
CO2 SERPL-SCNC: 25 MMOL/L
CREAT SERPL-MCNC: 0.83 MG/DL
CRP SERPL-MCNC: 7 MG/L
EGFR: 80 ML/MIN/1.73M2
EOSINOPHIL # BLD AUTO: 0.1 K/UL
EOSINOPHIL NFR BLD AUTO: 1.3 %
ERYTHROCYTE [SEDIMENTATION RATE] IN BLOOD BY WESTERGREN METHOD: 26 MM/HR
GLUCOSE SERPL-MCNC: 108 MG/DL
HCT VFR BLD CALC: 39.8 %
HGB BLD-MCNC: 13 G/DL
IMM GRANULOCYTES NFR BLD AUTO: 0.4 %
LYMPHOCYTES # BLD AUTO: 1.68 K/UL
LYMPHOCYTES NFR BLD AUTO: 22.1 %
MAN DIFF?: NORMAL
MCHC RBC-ENTMCNC: 27.3 PG
MCHC RBC-ENTMCNC: 32.7 GM/DL
MCV RBC AUTO: 83.4 FL
MONOCYTES # BLD AUTO: 0.58 K/UL
MONOCYTES NFR BLD AUTO: 7.6 %
NEUTROPHILS # BLD AUTO: 5.19 K/UL
NEUTROPHILS NFR BLD AUTO: 68.2 %
PLATELET # BLD AUTO: 240 K/UL
POTASSIUM SERPL-SCNC: 4.8 MMOL/L
PROT SERPL-MCNC: 7 G/DL
RBC # BLD: 4.77 M/UL
RBC # FLD: 14.6 %
SODIUM SERPL-SCNC: 142 MMOL/L
WBC # FLD AUTO: 7.61 K/UL

## 2024-04-11 ENCOUNTER — APPOINTMENT (OUTPATIENT)
Dept: INTERNAL MEDICINE | Facility: CLINIC | Age: 59
End: 2024-04-11
Payer: COMMERCIAL

## 2024-04-11 VITALS
DIASTOLIC BLOOD PRESSURE: 97 MMHG | WEIGHT: 272 LBS | HEIGHT: 67 IN | TEMPERATURE: 97.4 F | BODY MASS INDEX: 42.69 KG/M2 | OXYGEN SATURATION: 98 % | SYSTOLIC BLOOD PRESSURE: 148 MMHG | RESPIRATION RATE: 20 BRPM | HEART RATE: 88 BPM

## 2024-04-11 DIAGNOSIS — J04.10 ACUTE TRACHEITIS W/OUT OBSTRUCTION: ICD-10-CM

## 2024-04-11 DIAGNOSIS — M06.00 RHEUMATOID ARTHRITIS W/OUT RHEUMATOID FACTOR, UNSPECIFIED SITE: ICD-10-CM

## 2024-04-11 DIAGNOSIS — R05.9 COUGH, UNSPECIFIED: ICD-10-CM

## 2024-04-11 DIAGNOSIS — Z87.891 PERSONAL HISTORY OF NICOTINE DEPENDENCE: ICD-10-CM

## 2024-04-11 DIAGNOSIS — R06.09 OTHER FORMS OF DYSPNEA: ICD-10-CM

## 2024-04-11 DIAGNOSIS — J98.01 ACUTE BRONCHOSPASM: ICD-10-CM

## 2024-04-11 DIAGNOSIS — E66.01 MORBID (SEVERE) OBESITY DUE TO EXCESS CALORIES: ICD-10-CM

## 2024-04-11 PROCEDURE — 99215 OFFICE O/P EST HI 40 MIN: CPT | Mod: 25

## 2024-04-11 PROCEDURE — ZZZZZ: CPT

## 2024-04-11 PROCEDURE — 94729 DIFFUSING CAPACITY: CPT

## 2024-04-11 PROCEDURE — 99205 OFFICE O/P NEW HI 60 MIN: CPT | Mod: 25

## 2024-04-11 PROCEDURE — 94664 DEMO&/EVAL PT USE INHALER: CPT | Mod: 59

## 2024-04-11 PROCEDURE — 94060 EVALUATION OF WHEEZING: CPT

## 2024-04-11 PROCEDURE — 94727 GAS DIL/WSHOT DETER LNG VOL: CPT

## 2024-04-11 RX ORDER — LEFLUNOMIDE 10 MG/1
10 TABLET, FILM COATED ORAL DAILY
Qty: 30 | Refills: 2 | Status: DISCONTINUED | COMMUNITY
Start: 2024-01-30 | End: 2024-04-11

## 2024-04-12 NOTE — DATA REVIEWED
[FreeTextEntry1] : A pulmonary function test is performed.  Lung volumes and flow rates are within normal limits.  Slight bronchodilator reactivity is noted.  The DLCO and saturation are maintained.  This represents fairly normal physiologic function.  Mild airway reactivity is demonstrated.

## 2024-04-12 NOTE — PHYSICAL EXAM
[No Acute Distress] : no acute distress [Well Nourished] : well nourished [Well Developed] : well developed [Well-Appearing] : well-appearing [Normal Sclera/Conjunctiva] : normal sclera/conjunctiva [PERRL] : pupils equal round and reactive to light [EOMI] : extraocular movements intact [Normal Outer Ear/Nose] : the outer ears and nose were normal in appearance [No JVD] : no jugular venous distention [Supple] : supple [No Respiratory Distress] : no respiratory distress  [No Accessory Muscle Use] : no accessory muscle use [Clear to Auscultation] : lungs were clear to auscultation bilaterally [Normal Rate] : normal rate  [Regular Rhythm] : with a regular rhythm [Normal S1, S2] : normal S1 and S2 [No Murmur] : no murmur heard [No Edema] : there was no peripheral edema [No Extremity Clubbing/Cyanosis] : no extremity clubbing/cyanosis [Soft] : abdomen soft [Non Tender] : non-tender [Non-distended] : non-distended [No Masses] : no abdominal mass palpated [No HSM] : no HSM [Normal Bowel Sounds] : normal bowel sounds [Normal Supraclavicular Nodes] : no supraclavicular lymphadenopathy [Normal Posterior Cervical Nodes] : no posterior cervical lymphadenopathy [Normal Anterior Cervical Nodes] : no anterior cervical lymphadenopathy [No CVA Tenderness] : no CVA  tenderness [No Spinal Tenderness] : no spinal tenderness [No Rash] : no rash [Coordination Grossly Intact] : coordination grossly intact [Normal Gait] : normal gait [Normal Affect] : the affect was normal [Normal Insight/Judgement] : insight and judgment were intact [de-identified] : Mild erythema of the posterior pharynx is noted.  She has a Mallampati score of 1.  The nasal mucosa is mildly erythematous bilaterally [de-identified] : There are no wheezes with forced maneuvers

## 2024-04-12 NOTE — PLAN
[FreeTextEntry1] : 1. Continue current medications as outlined above.   2. 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  To this end, she will start prednisone and a dose of 60 mg 3 days, 40 mg 4 days, 20 mg 5 days. 10 mg 6 days.  This will be to reduce the inflammation of the trachea, and to promote healing of the mucosal lining.  3. Patient will begin taking prilosec otc 1 tablet per day, while she is on the prednisone  4. Patient will begin qvar 80 mcg 2 puffs BiD via aerochamber. Patient will rinse mouth afterwards.  She will remain on this medication, and till she is reevaluated by myself in this office.  We will hopefully be able to taper her off of this medication in the next 2 to 3 months.  She was given an extensive discussion on the proper use and technique of the metered-dose inhaler.  5. Follow up in 6 weeks with pre post spirometry.  If she is improved from a clinical standpoint, at that point I will consider reducing the Qvar to a once daily regimen for another month, before potentially completely discontinuing it.  6.  Routine medical follow-up with her primary care physician, Dr. Collier.

## 2024-04-12 NOTE — HISTORY OF PRESENT ILLNESS
[FreeTextEntry1] : The patient comes in today for a new patient evaluation. [de-identified] :  Ms. LEVIN is a 59 year female  with a history of obesity and seronegative rheumatoid arthritis, who presents for an initial evaluation of a protracted cough.   The patient follows with her PCP, Dr. Collier. She recently began a regimen of Wegovy 0.25 mg/0.5 mL due to her centripetal obesity. The patient has tolerated the medication well, without any side effects or adverse reactions.   She additionally follows with her rheumatologist, Dr. Brown regarding a history of serum negative RA. The patient has had progressively worsening pain and morning stiffness over the last 8-10 months, and initially consulted with Dr. Brown in Dec 2023.  At that time, she was placed on a tapered dose of prednisone 20 mg/day for 3 days, 15 mg/day for 3 days, 10 mg/day for 3 days, and 5 mg/day for 3 days.  She responded nicely to this regimen, with a decrease in her symptoms.  She was then re-evaluated by Dr. Brown at the end of January, and was placed on a regimen of Arava 10 mg/day. She reports that her symptoms have improved since starting this medication.  Unfortunately, she discontinued it 3 weeks ago, because she thought that this may have been contributing to her cough.   The patient was in her usual state until late December 2023, when she began experiencing URI symptoms, including a dry cough, headache, body aches, and chills.  The patient works in a nursing home, and reports that she may have been exposed to a virus in the workplace. The patient reported to urgent care on 12/27, and tested negative for COVID and flu at that time. She was advised to use OTC nasal spray and robotussin PRN. The patient continued to experience a dry cough over the next several weeks, and followed with Dr. Curtis on 1/2/24.  At that time, a chest x-ray was performed, and the patient was prescribed a medrol dose pack and tessalon perles. Her cough improved slightly after this, but unfortunately her cough began to spontaneously worsen once again at the end of January. She began to experience chills and night sweats once again at this time. She followed up with her PCP, Dr. Collier, and was prescribed albuterol, zithromax, and another medrol dose pack.  She experienced a partial response with these medications, but the cough has resumed once again at this time.   She continues to cough at this time.  She describes that her symptoms worsen when lying down, and that she occasionally experiences GERD symptoms as well.  She has taken prilosec OTC PRN when experiencing reflux symptoms, but she does not have reflux symptoms on a regular basis..  The patient denies post nasal drip or wheezing.    The remainder of the patient's pulmonary history is positive for cigarette smoking. She smoked about 1-2 cigarettes per day for about one year.  She discontinued her smoking habit at the age of 28.  She denies any history of PNA, asthma, TB, or TB exposure. She has experienced ankle swelling in the past. She denies paroxysmal nocturnal dyspnea, or orthopnea.  She does not have a pet. She has traveled to [place] in the past 2 years. She denies exposure to toxic chemicals and/or fumes. She works in a nursing home.  There have been no other acute constitutional symptoms. She comes in for this assessment.

## 2024-04-15 ENCOUNTER — APPOINTMENT (OUTPATIENT)
Dept: INTERNAL MEDICINE | Facility: CLINIC | Age: 59
End: 2024-04-15
Payer: COMMERCIAL

## 2024-04-15 VITALS
SYSTOLIC BLOOD PRESSURE: 146 MMHG | HEIGHT: 67 IN | WEIGHT: 272 LBS | TEMPERATURE: 97.5 F | DIASTOLIC BLOOD PRESSURE: 90 MMHG | BODY MASS INDEX: 42.69 KG/M2 | OXYGEN SATURATION: 97 % | HEART RATE: 78 BPM

## 2024-04-15 PROCEDURE — 99213 OFFICE O/P EST LOW 20 MIN: CPT

## 2024-04-15 RX ORDER — LOSARTAN POTASSIUM 25 MG/1
25 TABLET, FILM COATED ORAL
Qty: 90 | Refills: 0 | Status: ACTIVE | COMMUNITY
Start: 2024-04-15 | End: 1900-01-01

## 2024-04-15 NOTE — HISTORY OF PRESENT ILLNESS
[FreeTextEntry8] : 59 year female presents to the office with complaints of elevated BP readings for several months, highest this weekend up to 160/100 with headaches and one episode of slight blurry vision. Patient reports she would lay down, take aspirin and feel better, BP would go down. Patient reports BP's 150/90 for several months, has not follow up. BP today 138/90 taken by me. Reports slight occipital headache. Patient was seen 4/11/24, started on high dose prednisone taper for post-infectious tracheitis. Denies chest pain, palpitations, SOB, numbness/tingling of extremities, dizziness, lightheadedness or blurry vision at present time.

## 2024-04-15 NOTE — ASSESSMENT
[FreeTextEntry1] : 1. Elevated BP readings: Start Losartan 25mg daily. Monitor home BP, make log and bring to next office visit. Limit sodium intake to 2g daily, stay hydrated, weekly exercise of 150 minutes or more. Reviewed signs and symptoms that warrant urgent ER evaluation. Reviewed prednisone can cause elevated BP readings, monitor BP closely. Follow up in 4 weeks, sooner if needed.

## 2024-04-16 ENCOUNTER — APPOINTMENT (OUTPATIENT)
Dept: RHEUMATOLOGY | Facility: CLINIC | Age: 59
End: 2024-04-16
Payer: COMMERCIAL

## 2024-04-16 VITALS
SYSTOLIC BLOOD PRESSURE: 164 MMHG | BODY MASS INDEX: 41.52 KG/M2 | HEART RATE: 67 BPM | WEIGHT: 274 LBS | HEIGHT: 68 IN | OXYGEN SATURATION: 97 % | RESPIRATION RATE: 20 BRPM | DIASTOLIC BLOOD PRESSURE: 96 MMHG

## 2024-04-16 PROCEDURE — 99214 OFFICE O/P EST MOD 30 MIN: CPT

## 2024-04-16 RX ORDER — LEFLUNOMIDE 20 MG/1
20 TABLET, FILM COATED ORAL DAILY
Qty: 90 | Refills: 1 | Status: ACTIVE | COMMUNITY
Start: 2024-04-16 | End: 1900-01-01

## 2024-04-19 ENCOUNTER — APPOINTMENT (OUTPATIENT)
Dept: INTERNAL MEDICINE | Facility: CLINIC | Age: 59
End: 2024-04-19

## 2024-04-28 ENCOUNTER — INPATIENT (INPATIENT)
Facility: HOSPITAL | Age: 59
LOS: 1 days | Discharge: HOME | DRG: 392 | End: 2024-04-30
Attending: HOSPITALIST | Admitting: INTERNAL MEDICINE
Payer: COMMERCIAL

## 2024-04-28 VITALS
OXYGEN SATURATION: 100 % | TEMPERATURE: 98 F | HEART RATE: 104 BPM | WEIGHT: 265.44 LBS | RESPIRATION RATE: 19 BRPM | SYSTOLIC BLOOD PRESSURE: 153 MMHG | DIASTOLIC BLOOD PRESSURE: 97 MMHG

## 2024-04-28 PROCEDURE — 99285 EMERGENCY DEPT VISIT HI MDM: CPT

## 2024-04-28 RX ORDER — ONDANSETRON 8 MG/1
4 TABLET, FILM COATED ORAL ONCE
Refills: 0 | Status: COMPLETED | OUTPATIENT
Start: 2024-04-28 | End: 2024-04-28

## 2024-04-28 RX ORDER — SODIUM CHLORIDE 9 MG/ML
1000 INJECTION INTRAMUSCULAR; INTRAVENOUS; SUBCUTANEOUS ONCE
Refills: 0 | Status: COMPLETED | OUTPATIENT
Start: 2024-04-28 | End: 2024-04-28

## 2024-04-28 RX ADMIN — SODIUM CHLORIDE 1000 MILLILITER(S): 9 INJECTION INTRAMUSCULAR; INTRAVENOUS; SUBCUTANEOUS at 23:54

## 2024-04-28 RX ADMIN — ONDANSETRON 4 MILLIGRAM(S): 8 TABLET, FILM COATED ORAL at 23:54

## 2024-04-28 NOTE — ED ADULT TRIAGE NOTE - CHIEF COMPLAINT QUOTE
Patient presents to ER via wheelchair c/o N/V/D since Wednesday and today progressively worse with generalized weakness. Reports taking immodum at 7pm with no relief. Patient believes its food poisoning. Hx of HTN. Patient currently on weight loss injection. A&OX4.  No apparent distress noted.

## 2024-04-28 NOTE — ED ADULT NURSE NOTE - NSFALLUNIVINTERV_ED_ALL_ED
Bed/Stretcher in lowest position, wheels locked, appropriate side rails in place/Call bell, personal items and telephone in reach/Instruct patient to call for assistance before getting out of bed/chair/stretcher/Non-slip footwear applied when patient is off stretcher/[place]iley to call system/Physically safe environment - no spills, clutter or unnecessary equipment/Purposeful proactive rounding/Room/bathroom lighting operational, light cord in reach

## 2024-04-28 NOTE — ED ADULT NURSE NOTE - OBJECTIVE STATEMENT
Pt is 58yo F, A&XO3 who presents to ED with c/o diarrhea. As per pt, pt ate taco bowel on Wednesday when she began to develop nausea and diarrhea. pt reports diarrhea got increasingly worse today. Pt also endorses chills, "cramping abdominal pain", and "feels dehydrated'. pt reports taking anti-diarrhea medication this morning with mild relief. Pt states "Padmini been eating saltines and drinking Gatorade. I also haven't been taking my blood pressure medication because I don't think Ill be able to hold it down. I think I have food poisoning." Pt denies blood in stool. Pt does not appear in acute distress.   Denies vomiting, chest pain, SOB, fever.  hx of HTN

## 2024-04-29 DIAGNOSIS — R19.7 DIARRHEA, UNSPECIFIED: ICD-10-CM

## 2024-04-29 LAB
ADD ON TEST-SPECIMEN IN LAB: SIGNIFICANT CHANGE UP
ALBUMIN SERPL ELPH-MCNC: 3.4 G/DL — SIGNIFICANT CHANGE UP (ref 3.3–5)
ALP SERPL-CCNC: 102 U/L — SIGNIFICANT CHANGE UP (ref 40–120)
ALT FLD-CCNC: 33 U/L — SIGNIFICANT CHANGE UP (ref 12–78)
ANION GAP SERPL CALC-SCNC: 5 MMOL/L — SIGNIFICANT CHANGE UP (ref 5–17)
APPEARANCE UR: CLEAR — SIGNIFICANT CHANGE UP
AST SERPL-CCNC: 13 U/L — LOW (ref 15–37)
BACTERIA # UR AUTO: NEGATIVE /HPF — SIGNIFICANT CHANGE UP
BASOPHILS # BLD AUTO: 0.05 K/UL — SIGNIFICANT CHANGE UP (ref 0–0.2)
BASOPHILS NFR BLD AUTO: 0.4 % — SIGNIFICANT CHANGE UP (ref 0–2)
BILIRUB SERPL-MCNC: 0.6 MG/DL — SIGNIFICANT CHANGE UP (ref 0.2–1.2)
BILIRUB UR-MCNC: NEGATIVE — SIGNIFICANT CHANGE UP
BUN SERPL-MCNC: 16 MG/DL — SIGNIFICANT CHANGE UP (ref 7–23)
C DIFF BY PCR RESULT: SIGNIFICANT CHANGE UP
CALCIUM SERPL-MCNC: 8.9 MG/DL — SIGNIFICANT CHANGE UP (ref 8.5–10.1)
CAST: 2 /LPF — SIGNIFICANT CHANGE UP (ref 0–4)
CHLORIDE SERPL-SCNC: 109 MMOL/L — HIGH (ref 96–108)
CO2 SERPL-SCNC: 24 MMOL/L — SIGNIFICANT CHANGE UP (ref 22–31)
COD CRY URNS QL: PRESENT
COLOR SPEC: YELLOW — SIGNIFICANT CHANGE UP
CREAT SERPL-MCNC: 1.05 MG/DL — SIGNIFICANT CHANGE UP (ref 0.5–1.3)
DIFF PNL FLD: NEGATIVE — SIGNIFICANT CHANGE UP
EGFR: 60 ML/MIN/1.73M2 — SIGNIFICANT CHANGE UP
EOSINOPHIL # BLD AUTO: 0.12 K/UL — SIGNIFICANT CHANGE UP (ref 0–0.5)
EOSINOPHIL NFR BLD AUTO: 0.9 % — SIGNIFICANT CHANGE UP (ref 0–6)
GI PCR PANEL: SIGNIFICANT CHANGE UP
GLUCOSE SERPL-MCNC: 114 MG/DL — HIGH (ref 70–99)
GLUCOSE UR QL: NEGATIVE MG/DL — SIGNIFICANT CHANGE UP
HCT VFR BLD CALC: 44.2 % — SIGNIFICANT CHANGE UP (ref 34.5–45)
HGB BLD-MCNC: 14.3 G/DL — SIGNIFICANT CHANGE UP (ref 11.5–15.5)
IMM GRANULOCYTES NFR BLD AUTO: 0.4 % — SIGNIFICANT CHANGE UP (ref 0–0.9)
KETONES UR-MCNC: NEGATIVE MG/DL — SIGNIFICANT CHANGE UP
LACTATE SERPL-SCNC: 2 MMOL/L — SIGNIFICANT CHANGE UP (ref 0.7–2)
LEUKOCYTE ESTERASE UR-ACNC: ABNORMAL
LIDOCAIN IGE QN: 14 U/L — SIGNIFICANT CHANGE UP (ref 13–75)
LYMPHOCYTES # BLD AUTO: 1.52 K/UL — SIGNIFICANT CHANGE UP (ref 1–3.3)
LYMPHOCYTES # BLD AUTO: 11.3 % — LOW (ref 13–44)
MAGNESIUM SERPL-MCNC: 2.2 MG/DL — SIGNIFICANT CHANGE UP (ref 1.6–2.6)
MCHC RBC-ENTMCNC: 27 PG — SIGNIFICANT CHANGE UP (ref 27–34)
MCHC RBC-ENTMCNC: 32.4 GM/DL — SIGNIFICANT CHANGE UP (ref 32–36)
MCV RBC AUTO: 83.4 FL — SIGNIFICANT CHANGE UP (ref 80–100)
MONOCYTES # BLD AUTO: 0.9 K/UL — SIGNIFICANT CHANGE UP (ref 0–0.9)
MONOCYTES NFR BLD AUTO: 6.7 % — SIGNIFICANT CHANGE UP (ref 2–14)
NEUTROPHILS # BLD AUTO: 10.8 K/UL — HIGH (ref 1.8–7.4)
NEUTROPHILS NFR BLD AUTO: 80.3 % — HIGH (ref 43–77)
NITRITE UR-MCNC: NEGATIVE — SIGNIFICANT CHANGE UP
PH UR: 5.5 — SIGNIFICANT CHANGE UP (ref 5–8)
PLATELET # BLD AUTO: 247 K/UL — SIGNIFICANT CHANGE UP (ref 150–400)
POTASSIUM SERPL-MCNC: 3.2 MMOL/L — LOW (ref 3.5–5.3)
POTASSIUM SERPL-SCNC: 3.2 MMOL/L — LOW (ref 3.5–5.3)
PROT SERPL-MCNC: 7.6 GM/DL — SIGNIFICANT CHANGE UP (ref 6–8.3)
PROT UR-MCNC: SIGNIFICANT CHANGE UP MG/DL
RBC # BLD: 5.3 M/UL — HIGH (ref 3.8–5.2)
RBC # FLD: 14.2 % — SIGNIFICANT CHANGE UP (ref 10.3–14.5)
RBC CASTS # UR COMP ASSIST: 2 /HPF — SIGNIFICANT CHANGE UP (ref 0–4)
SODIUM SERPL-SCNC: 138 MMOL/L — SIGNIFICANT CHANGE UP (ref 135–145)
SP GR SPEC: >1.03 — HIGH (ref 1–1.03)
SQUAMOUS # UR AUTO: 3 /HPF — SIGNIFICANT CHANGE UP (ref 0–5)
TROPONIN I, HIGH SENSITIVITY RESULT: 6.98 NG/L — SIGNIFICANT CHANGE UP
TROPONIN I, HIGH SENSITIVITY RESULT: 8.02 NG/L — SIGNIFICANT CHANGE UP
UROBILINOGEN FLD QL: 1 MG/DL — SIGNIFICANT CHANGE UP (ref 0.2–1)
WBC # BLD: 13.44 K/UL — HIGH (ref 3.8–10.5)
WBC # FLD AUTO: 13.44 K/UL — HIGH (ref 3.8–10.5)
WBC UR QL: 16 /HPF — HIGH (ref 0–5)

## 2024-04-29 PROCEDURE — 85027 COMPLETE CBC AUTOMATED: CPT

## 2024-04-29 PROCEDURE — 81001 URINALYSIS AUTO W/SCOPE: CPT

## 2024-04-29 PROCEDURE — G0378: CPT

## 2024-04-29 PROCEDURE — 99223 1ST HOSP IP/OBS HIGH 75: CPT

## 2024-04-29 PROCEDURE — 74177 CT ABD & PELVIS W/CONTRAST: CPT | Mod: 26,MC

## 2024-04-29 PROCEDURE — 94640 AIRWAY INHALATION TREATMENT: CPT

## 2024-04-29 PROCEDURE — 80048 BASIC METABOLIC PNL TOTAL CA: CPT

## 2024-04-29 PROCEDURE — 36415 COLL VENOUS BLD VENIPUNCTURE: CPT

## 2024-04-29 PROCEDURE — 87086 URINE CULTURE/COLONY COUNT: CPT

## 2024-04-29 RX ORDER — LANOLIN ALCOHOL/MO/W.PET/CERES
3 CREAM (GRAM) TOPICAL AT BEDTIME
Refills: 0 | Status: DISCONTINUED | OUTPATIENT
Start: 2024-04-29 | End: 2024-04-30

## 2024-04-29 RX ORDER — ALBUTEROL 90 UG/1
2 AEROSOL, METERED ORAL EVERY 6 HOURS
Refills: 0 | Status: DISCONTINUED | OUTPATIENT
Start: 2024-04-29 | End: 2024-04-30

## 2024-04-29 RX ORDER — CIPROFLOXACIN LACTATE 400MG/40ML
400 VIAL (ML) INTRAVENOUS EVERY 12 HOURS
Refills: 0 | Status: DISCONTINUED | OUTPATIENT
Start: 2024-04-29 | End: 2024-04-30

## 2024-04-29 RX ORDER — LOSARTAN POTASSIUM 100 MG/1
1 TABLET, FILM COATED ORAL
Refills: 0 | DISCHARGE

## 2024-04-29 RX ORDER — ONDANSETRON 8 MG/1
4 TABLET, FILM COATED ORAL EVERY 8 HOURS
Refills: 0 | Status: DISCONTINUED | OUTPATIENT
Start: 2024-04-29 | End: 2024-04-30

## 2024-04-29 RX ORDER — ACETAMINOPHEN 500 MG
650 TABLET ORAL EVERY 6 HOURS
Refills: 0 | Status: DISCONTINUED | OUTPATIENT
Start: 2024-04-29 | End: 2024-04-30

## 2024-04-29 RX ORDER — MOMETASONE FUROATE 220 UG/1
1 INHALANT RESPIRATORY (INHALATION)
Refills: 0 | Status: DISCONTINUED | OUTPATIENT
Start: 2024-04-29 | End: 2024-04-30

## 2024-04-29 RX ORDER — SODIUM CHLORIDE 9 MG/ML
1000 INJECTION INTRAMUSCULAR; INTRAVENOUS; SUBCUTANEOUS
Refills: 0 | Status: DISCONTINUED | OUTPATIENT
Start: 2024-04-29 | End: 2024-04-30

## 2024-04-29 RX ORDER — METRONIDAZOLE 500 MG
500 TABLET ORAL EVERY 8 HOURS
Refills: 0 | Status: DISCONTINUED | OUTPATIENT
Start: 2024-04-29 | End: 2024-04-30

## 2024-04-29 RX ORDER — FLUTICASONE PROPIONATE 220 MCG
1 AEROSOL WITH ADAPTER (GRAM) INHALATION
Refills: 0 | DISCHARGE

## 2024-04-29 RX ORDER — INFLUENZA VIRUS VACCINE 15; 15; 15; 15 UG/.5ML; UG/.5ML; UG/.5ML; UG/.5ML
0.5 SUSPENSION INTRAMUSCULAR ONCE
Refills: 0 | Status: DISCONTINUED | OUTPATIENT
Start: 2024-04-29 | End: 2024-04-30

## 2024-04-29 RX ORDER — POTASSIUM CHLORIDE 20 MEQ
10 PACKET (EA) ORAL
Refills: 0 | Status: COMPLETED | OUTPATIENT
Start: 2024-04-29 | End: 2024-04-29

## 2024-04-29 RX ORDER — LOSARTAN POTASSIUM 100 MG/1
25 TABLET, FILM COATED ORAL DAILY
Refills: 0 | Status: DISCONTINUED | OUTPATIENT
Start: 2024-04-29 | End: 2024-04-30

## 2024-04-29 RX ORDER — METRONIDAZOLE 500 MG
500 TABLET ORAL ONCE
Refills: 0 | Status: COMPLETED | OUTPATIENT
Start: 2024-04-29 | End: 2024-04-29

## 2024-04-29 RX ORDER — SODIUM CHLORIDE 9 MG/ML
500 INJECTION INTRAMUSCULAR; INTRAVENOUS; SUBCUTANEOUS ONCE
Refills: 0 | Status: COMPLETED | OUTPATIENT
Start: 2024-04-29 | End: 2024-04-29

## 2024-04-29 RX ORDER — LEFLUNOMIDE 10 MG/1
1 TABLET ORAL
Refills: 0 | DISCHARGE

## 2024-04-29 RX ORDER — ALBUTEROL 90 UG/1
2 AEROSOL, METERED ORAL
Refills: 0 | DISCHARGE

## 2024-04-29 RX ADMIN — Medication 100 MILLIEQUIVALENT(S): at 13:43

## 2024-04-29 RX ADMIN — SODIUM CHLORIDE 500 MILLILITER(S): 9 INJECTION INTRAMUSCULAR; INTRAVENOUS; SUBCUTANEOUS at 04:27

## 2024-04-29 RX ADMIN — Medication 200 MILLIGRAM(S): at 21:09

## 2024-04-29 RX ADMIN — Medication 100 MILLIGRAM(S): at 22:26

## 2024-04-29 RX ADMIN — SODIUM CHLORIDE 125 MILLILITER(S): 9 INJECTION INTRAMUSCULAR; INTRAVENOUS; SUBCUTANEOUS at 16:46

## 2024-04-29 RX ADMIN — SODIUM CHLORIDE 125 MILLILITER(S): 9 INJECTION INTRAMUSCULAR; INTRAVENOUS; SUBCUTANEOUS at 08:23

## 2024-04-29 RX ADMIN — Medication 200 MILLIGRAM(S): at 09:59

## 2024-04-29 RX ADMIN — Medication 100 MILLIGRAM(S): at 16:46

## 2024-04-29 RX ADMIN — Medication 100 MILLIEQUIVALENT(S): at 11:56

## 2024-04-29 RX ADMIN — Medication 100 MILLIGRAM(S): at 04:12

## 2024-04-29 RX ADMIN — Medication 100 MILLIEQUIVALENT(S): at 10:21

## 2024-04-29 NOTE — H&P ADULT - NSHPPHYSICALEXAM_GEN_ALL_CORE
PEx  T(C): 36.6 (04-29-24 @ 06:00), Max: 36.8 (04-29-24 @ 00:32)  HR: 86 (04-29-24 @ 06:00) (84 - 104)  BP: 126/70 (04-29-24 @ 06:00) (126/70 - 153/97)  RR: 18 (04-29-24 @ 06:00) (18 - 19)  SpO2: 98% (04-29-24 @ 06:00) (98% - 100%)  Wt(kg): --  General:     Well appearing, well nourished in no distress, no identifying marks , scars, or tattoos.  Skin: no rash or prominent lesions  Head: normocephalic, atraumatic     Sinuses: non-tender  Nose: no external lesions, mucosa non-inflamed, septum and turbinates normal  Throat: no erythema, exudates or lesions.  Neck: Supple without lymphadenopathy. Thyroid no thyromegaly, no palpable thyroid nodules, no palpable nodules or masses, carotid arteries no bruits.   Breasts: No palpable masses or lesions.  Heart: RRR, no murmur or gallop.  Normal S1, S2.  No S3, S4.   Lungs: CTA bilaterally, no wheezes, rhonchi, rales.  Breathing unlabored.   Chest wall: Normal insp   Abdomen:  Soft, NT/ND, normal bowel sounds, no HSM, no masses.  No peritoneal signs.   Back: spine normal without deformity or tenderness.  Normal ROM   : Exam normal.  no inguinal hernias.  Extremities: No deformities, clubbing, cyanosis, or edema.  Musculoskeletal: Normal gait and station. No decreased range of motion, instability, atrophy or abnormal strength or tone in the head, neck, spine, ribs, pelvis or extremities.   Neurologic: CN 2-12 normal. Sensation to pain, touch and proprioception normal. DTRs normal in upper and lower extremities. No pathologic reflexes.  Motor normal.  Psychiatric: Oriented X3, intact recent and remote memory, judgement and insight, normal mood and affect. PEx  T(C): 36.6 (04-29-24 @ 06:00), Max: 36.8 (04-29-24 @ 00:32)  HR: 86 (04-29-24 @ 06:00) (84 - 104)  BP: 126/70 (04-29-24 @ 06:00) (126/70 - 153/97)  RR: 18 (04-29-24 @ 06:00) (18 - 19)  SpO2: 98% (04-29-24 @ 06:00) (98% - 100%)  Wt(kg): --  General:    NAD.  overweight  Skin: no rash or prominent lesions  Head: normocephalic, atraumatic     Sinuses: non-tender  Nose: no external lesions, mucosa non-inflamed, septum and turbinates normal  Throat: no erythema, exudates or lesions.  Neck: Supple without lymphadenopathy. Thyroid no thyromegaly, no palpable thyroid nodules, no palpable nodules or masses, carotid arteries no bruits.   Breasts: No palpable masses or lesions.  Heart: RRR, no murmur or gallop.  Normal S1, S2.  No S3, S4.   Lungs: CTA bilaterally, no wheezes, rhonchi, rales.  Breathing unlabored.   Chest wall: Normal insp   Abdomen:  Soft, NT/ND, normal bowel sounds, no HSM, no masses.  No peritoneal signs.   Back: spine normal without deformity or tenderness.  Normal ROM   : Exam normal.  no inguinal hernias.  Extremities: No deformities, clubbing, cyanosis, or edema.  Musculoskeletal: Normal gait and station. No decreased range of motion, instability, atrophy or abnormal strength or tone in the head, neck, spine, ribs, pelvis or extremities.   Neurologic: CN 2-12 normal. Sensation to pain, touch and proprioception normal. DTRs normal in upper and lower extremities. No pathologic reflexes.  Motor normal.  Psychiatric: Oriented X3, intact recent and remote memory, judgement and insight, normal mood and affect.

## 2024-04-29 NOTE — ED PROVIDER NOTE - CLINICAL SUMMARY MEDICAL DECISION MAKING FREE TEXT BOX
Profuse watery diarrhea, leukocytosis, clinical dehydration, failure to tolerate PO, labs, CT. admission for further IVF, and inpatient evaluation, r/o C diff.

## 2024-04-29 NOTE — H&P ADULT - NSHPLABSRESULTS_GEN_ALL_CORE
14.3   13.44 )-----------( 247      ( 28 Apr 2024 23:49 )             44.2     04-28    138  |  109<H>  |  16  ----------------------------<  114<H>  3.2<L>   |  24  |  1.05    Ca    8.9      28 Apr 2024 23:49  Mg     2.2     04-29    TPro  7.6  /  Alb  3.4  /  TBili  0.6  /  DBili  x   /  AST  13<L>  /  ALT  33  /  AlkPhos  102  04-28    CAPILLARY BLOOD GLUCOSE          Urinalysis Basic - ( 28 Apr 2024 23:49 )    Color: x / Appearance: x / SG: x / pH: x  Gluc: 114 mg/dL / Ketone: x  / Bili: x / Urobili: x   Blood: x / Protein: x / Nitrite: x   Leuk Esterase: x / RBC: x / WBC x   Sq Epi: x / Non Sq Epi: x / Bacteria: x 14.3   13.44 )-----------( 247      ( 28 Apr 2024 23:49 )             44.2     04-28    138  |  109<H>  |  16  ----------------------------<  114<H>  3.2<L>   |  24  |  1.05    Ca    8.9      28 Apr 2024 23:49  Mg     2.2     04-29    TPro  7.6  /  Alb  3.4  /  TBili  0.6  /  DBili  x   /  AST  13<L>  /  ALT  33  /  AlkPhos  102  04-28      Urinalysis Basic - ( 28 Apr 2024 23:49 )  Color: x / Appearance: x / SG: x / pH: x  Gluc: 114 mg/dL / Ketone: x  / Bili: x / Urobili: x   Blood: x / Protein: x / Nitrite: x   Leuk Esterase: x / RBC: x / WBC x   Sq Epi: x / Non Sq Epi: x / Bacteria: x      < from: CT Abdomen and Pelvis w/ IV Cont (04.29.24 @ 00:20) >      IMPRESSION:  There are nonspecific air-fluid levels in the colon without evidence for   an enteritis or colitis.  A nonspecific gastroenteritis should be   excluded clinically.    The appendix is normal.    Mild left-sided colonic diverticulosis without evidence for acute   diverticulitis.    A nonspecific, mildly enlarged aortocaval lymph node measures 1 cm short   axis, increased in size from 5 mm short axis on 08/27/2017..    < end of copied text >

## 2024-04-29 NOTE — PATIENT PROFILE ADULT - FALL HARM RISK - UNIVERSAL INTERVENTIONS
Bed in lowest position, wheels locked, appropriate side rails in place/Call bell, personal items and telephone in reach/Instruct patient to call for assistance before getting out of bed or chair/Non-slip footwear when patient is out of bed/Harriet to call system/Physically safe environment - no spills, clutter or unnecessary equipment/Purposeful Proactive Rounding/Room/bathroom lighting operational, light cord in reach

## 2024-04-29 NOTE — H&P ADULT - HISTORY OF PRESENT ILLNESS
58 y/o female with PMHX of HTN, obesity who presented to  with CC of nausea/ vomiting/ diarrhea.  Patient notes she hasn't been feeling well since last week wednesday.  Patient notes she ate some Mexican food with her coworkers and had start of symptoms later that day.  She had rice/ beans/ pork.  Another friend also had a taco and she had diarrhea later that day.  Patient notes she has had ongoing nausea/ vomiting/ diarrhea at home ongoing for the last 5-6 days.  She was unable to go to work due to symptoms.  She thought she was feeling better yesterday, she had one episode of diarrhea in the morning.  She tried to eat something later in the day and then had horrible stomach cramps/ diarrhea/ vomiting last evening.  She was vomiting and having diarrhea at the same time and thought she was going to pass out.  She has had stamach viruses in the past but never this severe.  She presented to the ER for evaluation.  In the ER, patient found to have WBC of 13.  K low @ 3.2.  CT abd pelvis with nonspecific air fluid levels without enteritis/ colitis concerning for gastroenteritis.  Patient given fluids/ zofran and admitted.        PAST MEDICAL & SURGICAL HISTORY:  as above      FAMILY HISTORY:  Patient denies family hx.        Social History:    .  Lives at home with .        Allergies:  No Known Allergies

## 2024-04-29 NOTE — ED PROVIDER NOTE - OBJECTIVE STATEMENT
59 year old female with no pertinent PMH presents with cc of profuse multiple watery diarrhea, nausea and vomiting, inability to tolerate PO, and abdominal pain. Patient with no known recent abx treatment. Symptoms ongoing for 2-3 days worsening. In the ER patient had at least one episode of profuse watery diarrhea. No cp, sob or palpitation. No visual or focal neurological complaints. No recent trauma. Here with spouse. No skin rash.

## 2024-04-29 NOTE — H&P ADULT - NSHPREVIEWOFSYSTEMS_GEN_ALL_CORE
ROS:  General:  No fevers, chills, or unexplained weight loss  Skin: No rash or bothersome skin lesions  Musculoskeletal: No arthalgias, myalgias or joint swelling  Eyes: No visual changes or eye pain  Ears: No hearing loss , otorrhea or ear pain  Nose, Mouth, Throat: No nasal congestion, rhinorrhea, oral lesions, postnasal drip or sore throat  Cardio: No chest pain or palpitations. no lower extremity edema. no syncope. no claudication.   Respiratory: No cough, shortness of breath or wheezing   GI: No diarrhea, constipation, blood in stools, abdominal pain, vomiting or heartburn  : No urinary frequency, hematuria, incontinence, or dysuria  Neurologic: No headaches, parasthesias, confusion, dysarthria or gait instability  Psychiatric:  No anxiety or depression  Lymphatic:  No easy bruising, easy bleeding or swollen glands  Allergic: No itching, sneezing , watery eyes, clear rhinorrhea or recurrent infections ROS:  General:  weakness  Skin: No rash or bothersome skin lesions  Musculoskeletal: No arthalgias, myalgias or joint swelling  Eyes: No visual changes or eye pain  Ears: No hearing loss , otorrhea or ear pain  Nose, Mouth, Throat: No nasal congestion, rhinorrhea, oral lesions, postnasal drip or sore throat  Cardio: No chest pain or palpitations. no lower extremity edema. no syncope. no claudication.   Respiratory: No cough, shortness of breath or wheezing   GI: see hpi  : No urinary frequency, hematuria, incontinence, or dysuria  Neurologic: No headaches, parasthesias, confusion, dysarthria or gait instability  Psychiatric:  No anxiety or depression  Lymphatic:  No easy bruising, easy bleeding or swollen glands  Allergic: No itching, sneezing , watery eyes, clear rhinorrhea or recurrent infections

## 2024-04-29 NOTE — ED ADULT NURSE REASSESSMENT NOTE - NS ED NURSE REASSESS COMMENT FT1
Pt states "I feel a little better, the nausea improved," Pt does not appear in distress. Pt appears well. Pt updated on plan of care. Safety and comfort maintained.

## 2024-04-29 NOTE — ED ADULT NURSE REASSESSMENT NOTE - NS ED NURSE REASSESS COMMENT FT1
Received pt. A/Ox4. Pt states she is feeling better, states she ate something on Wednesday and has been vomiting and diarrhea persistently ever since. She states stools are watery, no blood noted. No c.o pain at this time. Plan of care discussed with pt, awaiting bed upstairs.

## 2024-04-29 NOTE — H&P ADULT - ASSESSMENT
60 y/o female with PMHX of HTN, obesity who presented to  with CC of nausea/ vomiting/ diarrhea.  Patient notes she hasn't been feeling well since last week wednesday.  Patient notes she ate some Mexican food with her coworkers and had start of symptoms later that day.  She had rice/ beans/ pork.  Another friend also had a taco and she had diarrhea later that day.  Patient notes she has had ongoing nausea/ vomiting/ diarrhea at home ongoing for the last 5-6 days.  She was unable to go to work due to symptoms.  She thought she was feeling better yesterday, she had one episode of diarrhea in the morning.  She tried to eat something later in the day and then had horrible stomach cramps/ diarrhea/ vomiting last evening.  She was vomiting and having diarrhea at the same time and thought she was going to pass out.  She has had stamach viruses in the past but never this severe.  She presented to the ER for evaluation.  In the ER, patient found to have WBC of 13.  K low @ 3.2.  CT abd pelvis with nonspecific air fluid levels without enteritis/ colitis concerning for gastroenteritis.  Patient given fluids/ zofran and admitted.   58 y/o female with PMHX of HTN, obesity who presented to  with CC of nausea/ vomiting/ diarrhea.  In the ER, patient found to have WBC of 13.  K low @ 3.2.  CT abd pelvis with nonspecific air fluid levels without enteritis/ colitis concerning for gastroenteritis.      #Acute gastroenteritis:    Admit to medsurg.    IV zofran.    PRN morphine for pain.    CT scan reviewed-- suspected gastroenteritis.    Check cdiff/ GI PCR.    R/o food poisoning as patient notes started after mexican meal on WED.    Cipro/ flagyl pending cultures.    R/o norovirus with prolonged course.    Patient is immunosupressed on leflunomide and recent prednisone use.    F/u cultures.    Clear liquids today.      #Hypokalemia:    Replete 3 runs KCL.      #HTN:    Cont ARB.      #Asthma:    Cont home inhalers.      #DVT Proph:  Ambulation for now.

## 2024-04-29 NOTE — ED ADULT NURSE REASSESSMENT NOTE - NS ED NURSE REASSESS COMMENT FT1
Pt report received from Noni GARRETT. Pt contact made, resting in stretcher comfortably locked in lowest position. Pt is Aox4 and speaking in full sentences,, no other verbal complaints at this time.

## 2024-04-29 NOTE — ED PROVIDER NOTE - DIFFERENTIAL DIAGNOSIS
Profuse watery diarrhea, leukocytosis, clinical dehydration, failure to tolerate PO, labs, CT. admission for further IVF, and inpatient evaluation, r/o C diff. Differential Diagnosis

## 2024-04-29 NOTE — ED PROVIDER NOTE - PROGRESS NOTE DETAILS
El ORDAZ; Patient with multiple episodes of profuse watery diarrhea, abdominal cramping, CT shows no colitis but given leukocytosis, inability to tolerate PO, dehydrated state and profuse diarrhea will consider C diff. Stool GI and C diff samples collected in the ER, pt to be admitted to the medical services. Spoke with Dr. Elaine Rosenberg, hospitalist admission of the patient is appreciated. MS, CHUCK.

## 2024-04-30 ENCOUNTER — TRANSCRIPTION ENCOUNTER (OUTPATIENT)
Age: 59
End: 2024-04-30

## 2024-04-30 VITALS
DIASTOLIC BLOOD PRESSURE: 83 MMHG | HEART RATE: 79 BPM | TEMPERATURE: 98 F | RESPIRATION RATE: 17 BRPM | SYSTOLIC BLOOD PRESSURE: 122 MMHG | OXYGEN SATURATION: 99 %

## 2024-04-30 LAB
ANION GAP SERPL CALC-SCNC: 3 MMOL/L — LOW (ref 5–17)
BUN SERPL-MCNC: 8 MG/DL — SIGNIFICANT CHANGE UP (ref 7–23)
CALCIUM SERPL-MCNC: 8.9 MG/DL — SIGNIFICANT CHANGE UP (ref 8.5–10.1)
CHLORIDE SERPL-SCNC: 110 MMOL/L — HIGH (ref 96–108)
CO2 SERPL-SCNC: 27 MMOL/L — SIGNIFICANT CHANGE UP (ref 22–31)
CREAT SERPL-MCNC: 0.74 MG/DL — SIGNIFICANT CHANGE UP (ref 0.5–1.3)
CULTURE RESULTS: SIGNIFICANT CHANGE UP
EGFR: 92 ML/MIN/1.73M2 — SIGNIFICANT CHANGE UP
GLUCOSE SERPL-MCNC: 99 MG/DL — SIGNIFICANT CHANGE UP (ref 70–99)
HCT VFR BLD CALC: 40.4 % — SIGNIFICANT CHANGE UP (ref 34.5–45)
HGB BLD-MCNC: 12.8 G/DL — SIGNIFICANT CHANGE UP (ref 11.5–15.5)
MCHC RBC-ENTMCNC: 26.9 PG — LOW (ref 27–34)
MCHC RBC-ENTMCNC: 31.7 GM/DL — LOW (ref 32–36)
MCV RBC AUTO: 84.9 FL — SIGNIFICANT CHANGE UP (ref 80–100)
PLATELET # BLD AUTO: 217 K/UL — SIGNIFICANT CHANGE UP (ref 150–400)
POTASSIUM SERPL-MCNC: 3.3 MMOL/L — LOW (ref 3.5–5.3)
POTASSIUM SERPL-SCNC: 3.3 MMOL/L — LOW (ref 3.5–5.3)
RBC # BLD: 4.76 M/UL — SIGNIFICANT CHANGE UP (ref 3.8–5.2)
RBC # FLD: 14.1 % — SIGNIFICANT CHANGE UP (ref 10.3–14.5)
SODIUM SERPL-SCNC: 140 MMOL/L — SIGNIFICANT CHANGE UP (ref 135–145)
SPECIMEN SOURCE: SIGNIFICANT CHANGE UP
WBC # BLD: 8 K/UL — SIGNIFICANT CHANGE UP (ref 3.8–10.5)
WBC # FLD AUTO: 8 K/UL — SIGNIFICANT CHANGE UP (ref 3.8–10.5)

## 2024-04-30 PROCEDURE — 99239 HOSP IP/OBS DSCHRG MGMT >30: CPT

## 2024-04-30 RX ORDER — CIPROFLOXACIN LACTATE 400MG/40ML
1 VIAL (ML) INTRAVENOUS
Qty: 10 | Refills: 0
Start: 2024-04-30 | End: 2024-05-04

## 2024-04-30 RX ORDER — SEMAGLUTIDE 0.68 MG/ML
0.5 INJECTION, SOLUTION SUBCUTANEOUS
Refills: 0 | DISCHARGE

## 2024-04-30 RX ORDER — METRONIDAZOLE 500 MG
1 TABLET ORAL
Qty: 15 | Refills: 0
Start: 2024-04-30 | End: 2024-05-04

## 2024-04-30 RX ORDER — POTASSIUM CHLORIDE 20 MEQ
40 PACKET (EA) ORAL ONCE
Refills: 0 | Status: COMPLETED | OUTPATIENT
Start: 2024-04-30 | End: 2024-04-30

## 2024-04-30 RX ADMIN — SODIUM CHLORIDE 125 MILLILITER(S): 9 INJECTION INTRAMUSCULAR; INTRAVENOUS; SUBCUTANEOUS at 09:30

## 2024-04-30 RX ADMIN — Medication 100 MILLIGRAM(S): at 06:15

## 2024-04-30 RX ADMIN — MOMETASONE FUROATE 1 PUFF(S): 220 INHALANT RESPIRATORY (INHALATION) at 08:32

## 2024-04-30 RX ADMIN — Medication 200 MILLIGRAM(S): at 09:30

## 2024-04-30 RX ADMIN — LOSARTAN POTASSIUM 25 MILLIGRAM(S): 100 TABLET, FILM COATED ORAL at 09:30

## 2024-04-30 RX ADMIN — Medication 40 MILLIEQUIVALENT(S): at 12:17

## 2024-04-30 NOTE — DISCHARGE NOTE PROVIDER - PROVIDER TOKENS
PROVIDER:[TOKEN:[[id number]],FOLLOWUP:[1 week]],PROVIDER:[TOKEN:[[id number]],FOLLOWUP:[2 weeks]]

## 2024-04-30 NOTE — DISCHARGE NOTE PROVIDER - CARE PROVIDERS DIRECT ADDRESSES
[email or web address]

## 2024-04-30 NOTE — DISCHARGE NOTE PROVIDER - HOSPITAL COURSE
HPI:  58 y/o female with PMHX of HTN, obesity who presented to  with CC of nausea/ vomiting/ diarrhea.  Diagnose with nonspecific entero-colitis, started on abx, iv fluids. With this measure she continued to improve and currently feeling better, tolerating diet, she is being discharged on empirically po abx for possible infectious etiology for it.        Vital Signs Last 24 Hrs  T(C): 36.4 (30 Apr 2024 07:49), Max: 37.1 (29 Apr 2024 14:43)  T(F): 97.5 (30 Apr 2024 07:49), Max: 98.7 (29 Apr 2024 14:43)  HR: 79 (30 Apr 2024 07:49) (78 - 85)  BP: 122/83 (30 Apr 2024 07:49) (100/75 - 122/83)  BP(mean): --  RR: 17 (30 Apr 2024 07:49) (17 - 18)  SpO2: 99% (30 Apr 2024 07:49) (95% - 100%)    Parameters below as of 30 Apr 2024 07:49  Patient On (Oxygen Delivery Method): room air        General: WN/WD NAD  Head- Atraumatic, normocephalic   Neurology: A&Ox3, nonfocal, CN II to XII intact, power intact 5/5 in all muscle group  HEENT- PERRLA, moist mucous membrane  Neck-supple, no JVD  Respiratory: Air entry equal b/l, CTA   CVS:  S1S2, no murmurs, rubs or gallops  Abdominal: Soft, NT, ND +BS,   Genitourinary- voiding, non palpable bladder  Extremities: No edema, + peripheral pulses  Skin- no rash, no ulcer  Psychiatric- mood stable   LN- no lymphadenopathy         04-30-24 @ 12:12          Pt has been managed here symptomatically, currently hemodynamically stable and is being discharged with further management as an outpt, time spent 45 minutes

## 2024-04-30 NOTE — DISCHARGE NOTE PROVIDER - NSDCFUSCHEDAPPT_GEN_ALL_CORE_FT
Marcus Betancourt  [facility]  GENSURG [address]  Scheduled Appointment: 05/02/2024    [facility]  INTMED [address]  Scheduled Appointment: 05/13/2024    Александр Tellez  [facility]  INTMED [address]  Scheduled Appointment: 05/23/2024    Stefany Brown  [facility]  RHEUM [address]  Scheduled Appointment: 07/17/2024

## 2024-04-30 NOTE — DISCHARGE NOTE PROVIDER - NSDCCPCAREPLAN_GEN_ALL_CORE_FT
PRINCIPAL DISCHARGE DIAGNOSIS  Diagnosis: Enterocolitis  Assessment and Plan of Treatment:       SECONDARY DISCHARGE DIAGNOSES  Diagnosis: Unable to eat  Assessment and Plan of Treatment:

## 2024-04-30 NOTE — DISCHARGE NOTE NURSING/CASE MANAGEMENT/SOCIAL WORK - PATIENT PORTAL LINK FT
You can access the FollowMyHealth Patient Portal offered by [facility] by registering at the following website: [email or web address]. By joining [employer]’s FollowMyHealth portal, you will also be able to view your health information using other applications (apps) compatible with our system.

## 2024-04-30 NOTE — DISCHARGE NOTE PROVIDER - NSDCMRMEDTOKEN_GEN_ALL_CORE_FT
Albuterol (Eqv-Proventil HFA) 90 mcg/inh inhalation aerosol: 2 puff(s) inhaled every 6 hours as needed for  shortness of breath and/or wheezing  Arnuity Ellipta 200 mcg inhalation powder: 1 puff(s) inhaled once a day  Cipro 500 mg oral tablet: 1 tab(s) orally 2 times a day  leflunomide 20 mg oral tablet: 1 tab(s) orally once a day  losartan 25 mg oral tablet: 1 tab(s) orally once a day  metroNIDAZOLE 500 mg oral tablet: 1 tab(s) orally every 8 hours

## 2024-04-30 NOTE — CHART NOTE - NSCHARTNOTEFT_GEN_A_CORE
To Whom it may concern-     Ms. Britta Bonner was in this hospital from april 29 - april 30. She can return to work on May 2.     Thank you      [facility]   phone number- [phone]

## 2024-04-30 NOTE — DISCHARGE NOTE PROVIDER - CARE PROVIDER_API CALL
Wendy Collier  Internal Medicine  [address] [place] [address]  Phone: [phone]  Fax: [phone]  Follow Up Time: 1 week    Beto Lugo  Gastroenterology  [address] [place] [id number]  Phone: [phone]  Fax: [phone]  Follow Up Time: 2 weeks

## 2024-05-02 ENCOUNTER — APPOINTMENT (OUTPATIENT)
Dept: SURGERY | Facility: CLINIC | Age: 59
End: 2024-05-02
Payer: COMMERCIAL

## 2024-05-02 ENCOUNTER — NON-APPOINTMENT (OUTPATIENT)
Age: 59
End: 2024-05-02

## 2024-05-02 VITALS — WEIGHT: 264 LBS | BODY MASS INDEX: 40.01 KG/M2 | HEIGHT: 68 IN

## 2024-05-02 PROCEDURE — 99214 OFFICE O/P EST MOD 30 MIN: CPT

## 2024-05-02 NOTE — HISTORY OF PRESENT ILLNESS
[Home] : at home, [unfilled] , at the time of the visit. [Medical Office: ([facility])___] : at the medical office located in  [Verbal consent obtained from patient] : the patient, [unfilled] [FreeTextEntry1] : Kailey is a 59-year-old female who presented for initial obesity medicine consultation.  Patient stands 5 foot 8 inches tall and weighed 270 pounds with a BMI of 41 at consultation. She presented to discuss obesity medicine option.  Patient has been slowly gaining weight since menopause several years ago. She tries to increase her physical activity and "watch what she eats" however she seems to be gaining weight. I discussed with her that her best option at this point due to medication shortages would indeed be bariatric surgery such as the sleeve gastrectomy. She was very hesitant about bariatric surgery as she claims to be "very fearful of surgery as she has seen many complications". I explained to her that this surgery is extremely safe and effective however she wishes to try to obtain authorization for a GLP-1 injection at this point.  I explained to patient there are nationwide widespread shortages of all medications however I will try to obtain authorization for Wegovy as this may be slightly more available than alternate medications.  Patient currently on Wegovy and presents for follow-up today.  Patient down about 10 pounds since starting Wegovy 6 weeks ago.  Overall, she was doing well however reports going to the hospital last week for GI related symptoms such as nausea vomiting and diarrhea.  Wegovy was held for 1 week after this event.  Patient believes it is not correlated to her Wegovy as she did not feel these side effects with the initial dosing.  She states she had a dinner party at home and her and another person at the party got sick after eating dinner.  She believes it is gastroenteritis or food poisoning.  She reports normal lab results while in the hospital.  Inquiring whether she should resume her 0.5 mg Wegovy injection.  After long discussion we decided to resume her Wegovy as she is feeling good at the moment.  If symptoms recur, will stop Wegovy at once and contact me.  If she is tolerating Wegovy with no issues, next month I would like to increase her to 1 mg as tolerated.  Patient agrees with plan.  Will see in 2 months for follow-up.  Will also continue to increase physical activity as tolerated along with working on maintaining a low calorie high-protein diet.

## 2024-05-02 NOTE — ASSESSMENT
[FreeTextEntry1] : In summary, Kailey is a pleasant 59-year-old female who presents for Wegovy follow-up.  Was doing well on the initial 0.25 mg dose.  After injecting 0.5 mg, she was admitted to the hospital for signs and symptoms of gastroenteritis.  She had nausea, vomiting and diarrhea.  This resolved recently and feels well now.  She believes this is due to a food that she ate as one of her friends whom ate at her house also got sick.  Will cautiously restart Wegovy at the 0.5 mg dose.  If symptoms recur, patient was advised to stop medication and contact me at once.  If she is doing well, I will see in 2 months and increase her dose accordingly to the 1 mg dose.

## 2024-05-04 LAB
CULTURE RESULTS: SIGNIFICANT CHANGE UP
CULTURE RESULTS: SIGNIFICANT CHANGE UP
SPECIMEN SOURCE: SIGNIFICANT CHANGE UP
SPECIMEN SOURCE: SIGNIFICANT CHANGE UP

## 2024-05-06 DIAGNOSIS — J45.909 UNSPECIFIED ASTHMA, UNCOMPLICATED: ICD-10-CM

## 2024-05-06 DIAGNOSIS — K52.9 NONINFECTIVE GASTROENTERITIS AND COLITIS, UNSPECIFIED: ICD-10-CM

## 2024-05-06 DIAGNOSIS — E87.6 HYPOKALEMIA: ICD-10-CM

## 2024-05-06 DIAGNOSIS — I10 ESSENTIAL (PRIMARY) HYPERTENSION: ICD-10-CM

## 2024-05-10 ENCOUNTER — APPOINTMENT (OUTPATIENT)
Dept: INTERNAL MEDICINE | Facility: CLINIC | Age: 59
End: 2024-05-10
Payer: COMMERCIAL

## 2024-05-10 VITALS
DIASTOLIC BLOOD PRESSURE: 84 MMHG | HEART RATE: 79 BPM | BODY MASS INDEX: 40.01 KG/M2 | TEMPERATURE: 98.3 F | WEIGHT: 264 LBS | OXYGEN SATURATION: 99 % | SYSTOLIC BLOOD PRESSURE: 120 MMHG | HEIGHT: 68 IN

## 2024-05-10 DIAGNOSIS — K52.9 NONINFECTIVE GASTROENTERITIS AND COLITIS, UNSPECIFIED: ICD-10-CM

## 2024-05-10 DIAGNOSIS — R03.0 ELEVATED BLOOD-PRESSURE READING, W/OUT DIAGNOSIS OF HYPERTENSION: ICD-10-CM

## 2024-05-10 DIAGNOSIS — E87.6 HYPOKALEMIA: ICD-10-CM

## 2024-05-10 PROCEDURE — 99214 OFFICE O/P EST MOD 30 MIN: CPT

## 2024-05-10 RX ORDER — BECLOMETHASONE DIPROPIONATE HFA 80 UG/1
80 AEROSOL, METERED RESPIRATORY (INHALATION) TWICE DAILY
Qty: 1 | Refills: 11 | Status: COMPLETED | COMMUNITY
Start: 2024-04-11 | End: 2024-05-10

## 2024-05-10 RX ORDER — FLUTICASONE FUROATE 200 UG/1
200 POWDER RESPIRATORY (INHALATION)
Qty: 1 | Refills: 5 | Status: COMPLETED | COMMUNITY
Start: 2024-04-25 | End: 2024-05-10

## 2024-05-10 RX ORDER — PREDNISONE 20 MG/1
20 TABLET ORAL
Qty: 25 | Refills: 0 | Status: COMPLETED | COMMUNITY
Start: 2024-04-11 | End: 2024-05-10

## 2024-05-10 NOTE — HISTORY OF PRESENT ILLNESS
[Post-hospitalization from ___ Hospital] : Post-hospitalization from [unfilled] Hospital [Admitted on: ___] : The patient was admitted on [unfilled] [Discharged on ___] : discharged on [unfilled] [FreeTextEntry2] : Pt is a 59 yr old female with a h/o of HLD, morbid obesity, HTN was recently hospitalized with gastroenteritis after eating out a restaurant and developing diarrhea, vomiting. ABd / pelvic CT showed no acute process, nonspecific gastroenteritis.  She was discharged home on  Flagyl 500 mg po TID and Cipro 500 mg po BID x 7days.  K 3.3 while in hospitalized, was given replacement. C diff and GI PRR was negative.  She arrives today , feels better, symptoms have resolved.   BP today 132/78 , is on losartan 25 mg po qd . Denies headache, lightheadedness, vision changes.

## 2024-05-10 NOTE — PHYSICAL EXAM
[No Acute Distress] : no acute distress [Well Nourished] : well nourished [Well Developed] : well developed [No Respiratory Distress] : no respiratory distress  [No Accessory Muscle Use] : no accessory muscle use [Clear to Auscultation] : lungs were clear to auscultation bilaterally [Normal Rate] : normal rate  [Regular Rhythm] : with a regular rhythm [Normal S1, S2] : normal S1 and S2 [Soft] : abdomen soft [Non Tender] : non-tender [Normal Bowel Sounds] : normal bowel sounds [Coordination Grossly Intact] : coordination grossly intact [No Focal Deficits] : no focal deficits [Normal Gait] : normal gait [Normal Affect] : the affect was normal [Alert and Oriented x3] : oriented to person, place, and time [Normal Insight/Judgement] : insight and judgment were intact

## 2024-05-10 NOTE — ASSESSMENT
[FreeTextEntry1] : 1. s/p gastroenteritis finished antbx, symptoms resolved  recheck BMP   2. HTN  Continue current medications, Losartan 25 mg po qd  Limit sodium 2 gr daily  check BP at home, call if > 150/90  try to get 150 minuets of  exercise weekly  appropriate BW ordered today   3. obesity Continue Wekevin f/up with Dr. Marcus Betancourt  OV 3 months

## 2024-05-13 ENCOUNTER — APPOINTMENT (OUTPATIENT)
Dept: INTERNAL MEDICINE | Facility: CLINIC | Age: 59
End: 2024-05-13

## 2024-05-13 DIAGNOSIS — Z12.31 ENCOUNTER FOR SCREENING MAMMOGRAM FOR MALIGNANT NEOPLASM OF BREAST: ICD-10-CM

## 2024-05-15 ENCOUNTER — RX RENEWAL (OUTPATIENT)
Age: 59
End: 2024-05-15

## 2024-05-15 RX ORDER — BUDESONIDE AND FORMOTEROL FUMARATE DIHYDRATE 160; 4.5 UG/1; UG/1
160-4.5 AEROSOL RESPIRATORY (INHALATION)
Qty: 1 | Refills: 5 | Status: ACTIVE | COMMUNITY
Start: 2024-05-15 | End: 1900-01-01

## 2024-05-18 LAB
ANION GAP SERPL CALC-SCNC: 14 MMOL/L
BUN SERPL-MCNC: 11 MG/DL
CALCIUM SERPL-MCNC: 9.1 MG/DL
CHLORIDE SERPL-SCNC: 106 MMOL/L
CO2 SERPL-SCNC: 20 MMOL/L
CREAT SERPL-MCNC: 0.89 MG/DL
EGFR: 74 ML/MIN/1.73M2
GLUCOSE SERPL-MCNC: 126 MG/DL
POTASSIUM SERPL-SCNC: 3.5 MMOL/L
SODIUM SERPL-SCNC: 140 MMOL/L

## 2024-05-22 ENCOUNTER — RESULT REVIEW (OUTPATIENT)
Age: 59
End: 2024-05-22

## 2024-05-22 ENCOUNTER — OUTPATIENT (OUTPATIENT)
Dept: OUTPATIENT SERVICES | Facility: HOSPITAL | Age: 59
LOS: 1 days | End: 2024-05-22
Payer: COMMERCIAL

## 2024-05-22 ENCOUNTER — APPOINTMENT (OUTPATIENT)
Dept: MAMMOGRAPHY | Facility: CLINIC | Age: 59
End: 2024-05-22
Payer: COMMERCIAL

## 2024-05-22 DIAGNOSIS — Z12.31 ENCOUNTER FOR SCREENING MAMMOGRAM FOR MALIGNANT NEOPLASM OF BREAST: ICD-10-CM

## 2024-05-22 DIAGNOSIS — R92.8 OTHER ABNORMAL AND INCONCLUSIVE FINDINGS ON DIAGNOSTIC IMAGING OF BREAST: ICD-10-CM

## 2024-05-22 PROCEDURE — 77063 BREAST TOMOSYNTHESIS BI: CPT | Mod: 26

## 2024-05-22 PROCEDURE — 77067 SCR MAMMO BI INCL CAD: CPT | Mod: 26

## 2024-05-22 PROCEDURE — 77063 BREAST TOMOSYNTHESIS BI: CPT

## 2024-05-22 PROCEDURE — 77067 SCR MAMMO BI INCL CAD: CPT

## 2024-05-23 ENCOUNTER — APPOINTMENT (OUTPATIENT)
Dept: INTERNAL MEDICINE | Facility: CLINIC | Age: 59
End: 2024-05-23

## 2024-05-28 ENCOUNTER — RESULT REVIEW (OUTPATIENT)
Age: 59
End: 2024-05-28

## 2024-05-28 ENCOUNTER — APPOINTMENT (OUTPATIENT)
Dept: ULTRASOUND IMAGING | Facility: CLINIC | Age: 59
End: 2024-05-28
Payer: COMMERCIAL

## 2024-05-28 ENCOUNTER — APPOINTMENT (OUTPATIENT)
Dept: MAMMOGRAPHY | Facility: CLINIC | Age: 59
End: 2024-05-28
Payer: COMMERCIAL

## 2024-05-28 ENCOUNTER — OUTPATIENT (OUTPATIENT)
Dept: OUTPATIENT SERVICES | Facility: HOSPITAL | Age: 59
LOS: 1 days | End: 2024-05-28
Payer: COMMERCIAL

## 2024-05-28 DIAGNOSIS — Z00.8 ENCOUNTER FOR OTHER GENERAL EXAMINATION: ICD-10-CM

## 2024-05-28 PROCEDURE — 77065 DX MAMMO INCL CAD UNI: CPT

## 2024-05-28 PROCEDURE — 76642 ULTRASOUND BREAST LIMITED: CPT | Mod: 26,LT

## 2024-05-28 PROCEDURE — 76642 ULTRASOUND BREAST LIMITED: CPT

## 2024-05-28 PROCEDURE — 77065 DX MAMMO INCL CAD UNI: CPT | Mod: 26,LT

## 2024-05-31 ENCOUNTER — OUTPATIENT (OUTPATIENT)
Dept: OUTPATIENT SERVICES | Facility: HOSPITAL | Age: 59
LOS: 1 days | End: 2024-05-31
Payer: COMMERCIAL

## 2024-05-31 ENCOUNTER — RESULT REVIEW (OUTPATIENT)
Age: 59
End: 2024-05-31

## 2024-05-31 ENCOUNTER — APPOINTMENT (OUTPATIENT)
Dept: MAMMOGRAPHY | Facility: CLINIC | Age: 59
End: 2024-05-31
Payer: COMMERCIAL

## 2024-05-31 DIAGNOSIS — R92.8 OTHER ABNORMAL AND INCONCLUSIVE FINDINGS ON DIAGNOSTIC IMAGING OF BREAST: ICD-10-CM

## 2024-05-31 DIAGNOSIS — Z00.8 ENCOUNTER FOR OTHER GENERAL EXAMINATION: ICD-10-CM

## 2024-05-31 PROCEDURE — 77065 DX MAMMO INCL CAD UNI: CPT | Mod: 26,LT

## 2024-05-31 PROCEDURE — 77065 DX MAMMO INCL CAD UNI: CPT

## 2024-05-31 PROCEDURE — 88305 TISSUE EXAM BY PATHOLOGIST: CPT

## 2024-05-31 PROCEDURE — 19081 BX BREAST 1ST LESION STRTCTC: CPT | Mod: LT

## 2024-05-31 PROCEDURE — 19081 BX BREAST 1ST LESION STRTCTC: CPT

## 2024-05-31 PROCEDURE — 88305 TISSUE EXAM BY PATHOLOGIST: CPT | Mod: 26

## 2024-05-31 PROCEDURE — A4648: CPT

## 2024-06-10 RX ORDER — SEMAGLUTIDE 1.7 MG/.75ML
1.7 INJECTION, SOLUTION SUBCUTANEOUS
Qty: 4 | Refills: 1 | Status: ACTIVE | COMMUNITY
Start: 2024-03-21 | End: 1900-01-01

## 2024-07-11 ENCOUNTER — APPOINTMENT (OUTPATIENT)
Dept: SURGERY | Facility: CLINIC | Age: 59
End: 2024-07-11
Payer: COMMERCIAL

## 2024-07-11 VITALS — HEIGHT: 68 IN | BODY MASS INDEX: 38.8 KG/M2 | WEIGHT: 256 LBS

## 2024-07-11 PROCEDURE — 99213 OFFICE O/P EST LOW 20 MIN: CPT

## 2024-07-16 ENCOUNTER — NON-APPOINTMENT (OUTPATIENT)
Age: 59
End: 2024-07-16

## 2024-07-16 LAB
BASOPHILS # BLD AUTO: 0.03 K/UL
EOSINOPHIL # BLD AUTO: 0.09 K/UL
EOSINOPHIL NFR BLD AUTO: 1.6 %
HCT VFR BLD CALC: 37.8 %
HGB BLD-MCNC: 12 G/DL
IMM GRANULOCYTES NFR BLD AUTO: 0.4 %
LYMPHOCYTES NFR BLD AUTO: 27.1 %
MAN DIFF?: NORMAL
MCHC RBC-ENTMCNC: 27.1 PG
MCHC RBC-ENTMCNC: 31.7 GM/DL
MCV RBC AUTO: 85.5 FL
MONOCYTES # BLD AUTO: 0.34 K/UL
MONOCYTES NFR BLD AUTO: 6.2 %
NEUTROPHILS # BLD AUTO: 3.53 K/UL
NEUTROPHILS NFR BLD AUTO: 64.2 %
PLATELET # BLD AUTO: 238 K/UL
RBC # BLD: 4.42 M/UL
RBC # FLD: 14.6 %
WBC # FLD AUTO: 5.5 K/UL

## 2024-07-17 ENCOUNTER — APPOINTMENT (OUTPATIENT)
Dept: RHEUMATOLOGY | Facility: CLINIC | Age: 59
End: 2024-07-17
Payer: COMMERCIAL

## 2024-07-17 VITALS
HEIGHT: 68 IN | TEMPERATURE: 97.5 F | BODY MASS INDEX: 38.49 KG/M2 | SYSTOLIC BLOOD PRESSURE: 122 MMHG | WEIGHT: 254 LBS | OXYGEN SATURATION: 98 % | DIASTOLIC BLOOD PRESSURE: 70 MMHG | HEART RATE: 78 BPM

## 2024-07-17 DIAGNOSIS — M25.552 PAIN IN RIGHT HIP: ICD-10-CM

## 2024-07-17 DIAGNOSIS — M25.551 PAIN IN RIGHT HIP: ICD-10-CM

## 2024-07-17 LAB
25(OH)D3 SERPL-MCNC: 21.3 NG/ML
ALBUMIN SERPL ELPH-MCNC: 4 G/DL
ALP BLD-CCNC: 86 U/L
AMYLASE/CREAT SERPL: 57 U/L
ANION GAP SERPL CALC-SCNC: 11 MMOL/L
AST SERPL-CCNC: 17 U/L
BILIRUB SERPL-MCNC: 0.3 MG/DL
BUN SERPL-MCNC: 15 MG/DL
CALCIUM SERPL-MCNC: 8.9 MG/DL
CHLORIDE SERPL-SCNC: 107 MMOL/L
CHOLEST SERPL-MCNC: 164 MG/DL
CREAT SERPL-MCNC: 0.83 MG/DL
EGFR: 80 ML/MIN/1.73M2
FOLATE SERPL-MCNC: 7.9 NG/ML
GLUCOSE SERPL-MCNC: 120 MG/DL
HBA1C MFR BLD HPLC: 4.8 %
HCG SERPL-MCNC: 2 MIU/ML
HDLC SERPL-MCNC: 31 MG/DL
LDLC SERPL CALC-MCNC: 96 MG/DL
LPL SERPL-CCNC: 21 U/L
NONHDLC SERPL-MCNC: 133 MG/DL
POTASSIUM SERPL-SCNC: 3.9 MMOL/L
PROT SERPL-MCNC: 6.7 G/DL
SODIUM SERPL-SCNC: 142 MMOL/L
TRIGL SERPL-MCNC: 212 MG/DL
TSH SERPL-ACNC: 1.37 UIU/ML
VIT B12 SERPL-MCNC: 510 PG/ML

## 2024-07-17 PROCEDURE — 99214 OFFICE O/P EST MOD 30 MIN: CPT | Mod: 25

## 2024-07-17 PROCEDURE — 20610 DRAIN/INJ JOINT/BURSA W/O US: CPT | Mod: 50

## 2024-07-17 RX ORDER — TRIAMCINOLONE ACETONIDE 40 MG/ML
40 SUSPENSION INTRA-ARTERIAL; INTRAMUSCULAR
Refills: 0 | Status: COMPLETED | OUTPATIENT
Start: 2024-07-17

## 2024-07-17 RX ADMIN — TRIAMCINOLONE ACETONIDE 0 MG/ML: 40 INJECTION, SUSPENSION INTRA-ARTICULAR; INTRAMUSCULAR at 00:00

## 2024-07-22 LAB — VIT B1 SERPL-MCNC: 113.8 NMOL/L

## 2024-08-09 NOTE — ED ADULT TRIAGE NOTE - ACCOMPANIED BY
Occupational Therapy    Visit Type: initial evaluation  SUBJECTIVE  RN in agreement to work with patient for therapy session.  Patient verbally agrees to allow the following to be present during session: mother  Patient / Family Goal: unable to state    Pain   Face, Legs, Activity, Cry, Consolability Scale (FLACC)     Face: 0 - No particular expression or smile    Legs: 0 - Normal position or relaxed    Activity: 0 - Lying quietly, normal position, moves easily    Cry: 0 - No cry (awake or asleep)    Consolability: 0 - Content, relaxed    Score: 0    OBJECTIVE     Cognitive Status   Orientation    - Unable to assess  Functional Communication   - Forms of Communication: nonverbal  Following Direction   - does not follow commands     Range of Motion (ROM)   (degrees unless noted; active unless noted; norms in ( ); negative=lacking to 0, positive=beyond 0)  Comments: Not active movement in BUE. Slight tone noted in RUE when moved passively.              Interventions    Orthotic training: Resting hand splint applied to  patient's RUE with sleeve to assist with breakdown/redness prevention.  Educated nurse on wear schedule (4hrs on 4 hrs off) and to provide regular skin checks, nurse verbalized understanding.  OT to continue to assess while in house and make orthotic adjustments as needed.     Skilled input: verbal instruction/cues  Verbal Consent: Writer verbally educated and received verbal consent for hand placement, positioning of patient, and techniques to be performed today from patient's parent for therapist position for techniques and hand placement and palpation for techniques as described above and how they are pertinent to the patient's plan of care.         Education:   Education provided during session:  - Results of above outlined education: No evidence of learning    ASSESSMENT   Patient will benefit from inpatient skilled therapy to address current assessed functional limitations and  Self impairments.    Discharge needs based on today's assessment:  - Current level of function: significantly below baseline level of function  - Therapy needs at discharge: does not require ongoing therapy (pending progress)  - Activities of daily living (ADLs) requiring support at discharge: bed mobility, transfers, ambulation, feeding, dressing, grooming, toileting, continence and bathing  AM-PAC  - Prior Level of Function: IND/MOD I ([facility] 22-24)       Key: MOD A=moderate assistance, IND/MOD I=independent/modified independent  - Generalized Current Level of Function     - Current Self-Cares: 6       Scoring Key= >21 Modified Independent; 20-21 Supervision; 18-19 Minimal assist; 13-18 Moderate assist; 9-12 Max assist; <9 Total assist    AM-PAC Cognition Screen:  Cognition Score: 6/24  Cognition Interpretation: suspected impairment          Personal Occupations Profile Affected: bathing/showering, feeding, functional mobility/transfers, toileting/toilet hygiene, personal hygiene/grooming, upper body dressing, lower body dressing, personal device care      Clinical decision making: Moderate - Patient has several limitations (3-5), comorbidities and/or complexities, as noted in detailed assessment above, that impact their occupational profile.  Resulting in several treatment options and minimal to moderate task modification consistent with moderate clinical decision making complexity.    PLAN (while hospitalized)  Suggestions for next session as indicated:     OT Frequency: 1-2 x per week      PT/OT ADL Equipment for Discharge: tbd  Interventions: prosthetic/orthotic training, positioning and upper extremity strengthening/ROM  Agreement to plan and goals: patient unable to agree with goals and treatment plan and family/significant other/caregiver agrees      GOALS  Long Term Goals: (to be met by time of discharge from hospital)    Pt will tolerate splint schedule     Documented in the chart in the following areas:  Assessment/Plan.    Patient at End of Session:   Location: in bed  Safety measures: alarm system in place/re-engaged  Handoff to: nurse      Therapy procedure time and total treatment time can be found documented on the Time Entry flowsheet

## 2024-09-09 NOTE — DISCHARGE NOTE NURSING/CASE MANAGEMENT/SOCIAL WORK - NSTRANSFERBELONGINGSDISPO_GEN_A_NUR
Patient Education    [facility] PARENT  5 YEAR VISIT  Here are some suggestions from "[employer]s experts that may be of value to your family.     HOW YOUR FAMILY IS DOING  Spend time with your child. Hug and praise him.  Help your child do things for himself.  Help your child deal with conflict.  If you are worried about your living or food situation, talk with us. Community agencies and programs such as [employer] can also provide information and assistance.  Don t smoke or use e-cigarettes. Keep your home and car smoke-free. Tobacco-free spaces keep children healthy.  Don t use alcohol or drugs. If you re worried about a family member s use, let us know, or reach out to local or online resources that can help.    STAYING HEALTHY  Help your child brush his teeth twice a day  After breakfast  Before bed  Use a pea-sized amount of toothpaste with fluoride.  Help your child floss his teeth once a day.  Your child should visit the dentist at least twice a year.  Help your child be a healthy eater by  Providing healthy foods, such as vegetables, fruits, lean protein, and whole grains  Eating together as a family  Being a role model in what you eat  Buy fat-free milk and low-fat dairy foods. Encourage 2 to 3 servings each day.  Limit candy, soft drinks, juice, and sugary foods.  Make sure your child is active for 1 hour or more daily.  Don t put a TV in your child s bedroom.  Consider making a family media plan. It helps you make rules for media use and balance screen time with other activities, including exercise.    FAMILY RULES AND ROUTINES  Family routines create a sense of safety and security for your child.  Teach your child what is right and what is wrong.  Give your child chores to do and expect them to be done.  Use discipline to teach, not to punish.  Help your child deal with anger. Be a role model.  Teach your child to walk away when she is angry and do something else to calm down, such as playing  or reading.    READY FOR SCHOOL  Talk to your child about school.  Read books with your child about starting school.  Take your child to see the school and meet the teacher.  Help your child get ready to learn. Feed her a healthy breakfast and give her regular bedtimes so she gets at least 10 to 11 hours of sleep.  Make sure your child goes to a safe place after school.  If your child has disabilities or special health care needs, be active in the Individualized Education Program process.    SAFETY  Your child should always ride in the back seat (until at least 13 years of age) and use a forward-facing car safety seat or belt-positioning booster seat.  Teach your child how to safely cross the street and ride the school bus. Children are not ready to cross the street alone until 10 years or older.  Provide a properly fitting helmet and safety gear for riding scooters, biking, skating, in-line skating, skiing, snowboarding, and horseback riding.  Make sure your child learns to swim. Never let your child swim alone.  Use a hat, sun protection clothing, and sunscreen with SPF of 15 or higher on his exposed skin. Limit time outside when the sun is strongest (11:00 am-3:00 pm).  Teach your child about how to be safe with other adults.  No adult should ask a child to keep secrets from parents.  No adult should ask to see a child s private parts.  No adult should ask a child for help with the adult s own private parts.  Have working smoke and carbon monoxide alarms on every floor. Test them every month and change the batteries every year. Make a family escape plan in case of fire in your home.  If it is necessary to keep a gun in your home, store it unloaded and locked with the ammunition locked separately from the gun.  Ask if there are guns in homes where your child plays. If so, make sure they are stored safely.        Helpful Resources:  Family Media Use Plan: www.healthychildren.org/MediaUsePlan  Smoking Quit Line:  425.830.2091 Information About Car Safety Seats: www.safercar.gov/parents  Toll-free Auto Safety Hotline: 125.546.2032  Consistent with Bright Futures: Guidelines for Health Supervision of Infants, Children, and Adolescents, 4th Edition  For more information, go to https://brightfutures.aap.org.                    with patient

## 2024-09-10 ENCOUNTER — APPOINTMENT (OUTPATIENT)
Dept: SURGERY | Facility: CLINIC | Age: 59
End: 2024-09-10
Payer: COMMERCIAL

## 2024-09-10 VITALS — BODY MASS INDEX: 37.28 KG/M2 | HEIGHT: 68 IN | WEIGHT: 246 LBS

## 2024-09-10 PROCEDURE — 99213 OFFICE O/P EST LOW 20 MIN: CPT

## 2024-09-10 NOTE — ASSESSMENT
[FreeTextEntry1] : In summary Kailey is a pleasant 59-year-old female who presents for obesity medicine follow-up.  She was started on Wegovy and currently titrated up to maximum 2.4 mg dose and tolerating well.  She has lost 25 pounds in total and 10 pounds since her last visit.  I encouraged increase her physical activity as she is not exercising regularly currently.  I also suggested patient focus on lean high-quality proteins with decrease carbohydrates fats and sugars.  Labs reviewed from July and all within normal limits except vitamin D which was low at 21.  I recommended 2000 to 4000 IUs once daily over-the-counter.  I also recommended patient reduce her total calories and increase her physical activity.  I will see her in 2 months for follow-up.  Patient verbalized understanding of the mentioned above plan.

## 2024-09-10 NOTE — HISTORY OF PRESENT ILLNESS
[Home] : at home, [unfilled] , at the time of the visit. [Medical Office: ([facility])___] : at the medical office located in  [Verbal consent obtained from patient] : the patient, [unfilled] [FreeTextEntry1] : Kailey is a 59-year-old female who presented for initial obesity medicine consultation.  Patient stands 5 foot 8 inches tall and weighed 270 pounds with a BMI of 41 at consultation. She presented to discuss obesity medicine option.  Patient has been slowly gaining weight since menopause several years ago. She tries to increase her physical activity and "watch what she eats" however she seems to be gaining weight. I discussed with her that her best option at this point due to medication shortages would indeed be bariatric surgery such as the sleeve gastrectomy. She was very hesitant about bariatric surgery as she claims to be "very fearful of surgery as she has seen many complications". I explained to her that this surgery is extremely safe and effective however she wishes to try to obtain authorization for a GLP-1 injection at this point.  Patient currently on Wegovy and presents for follow-up today. On 2.4mg dose  Patient has lost a total of 25 pounds since starting Wegovy.  Has been on 2.4 mg for the last month.  Will continue this dose until we see each other again in December.  She has lost 10 additional pounds since being seen at last visit.  Patient is not currently exercising as she states "she has no time".  I highly encouraged her to start by walking and possibly adding resistance training methods.  Patient verbalized understanding.

## 2024-09-10 NOTE — HISTORY OF PRESENT ILLNESS
[Home] : at home, [unfilled] , at the time of the visit. [Medical Office: ([facility])___] : at the medical office located in  [Verbal consent obtained from patient] : the patient, [unfilled] [FreeTextEntry1] : Kailey is a 59-year-old female who presented for initial obesity medicine consultation.  Patient stands 5 foot 8 inches tall and weighed 270 pounds with a BMI of 41 at consultation. She presented to discuss obesity medicine option.  Patient has been slowly gaining weight since menopause several years ago. She tries to increase her physical activity and "watch what she eats" however she seems to be gaining weight. I discussed with her that her best option at this point due to medication shortages would indeed be bariatric surgery such as the sleeve gastrectomy. She was very hesitant about bariatric surgery as she claims to be "very fearful of surgery as she has seen many complications". I explained to her that this surgery is extremely safe and effective however she wishes to try to obtain authorization for a GLP-1 injection at this point.  Patient currently on Wegovy and presents for follow-up today. On 2.4mg dose  Patient has lost a total of 25 pounds since starting Wegovy.  Has been on 2.4 mg for the last month.  Will continue this dose until we see each other again in December.  She has lost 10 additional pounds since being seen at last visit.  Patient is not currently exercising as she states "she has no time".  I highly encouraged her to start by walking and possibly adding resistance training methods.  Patient verbalized understanding.

## 2024-09-24 ENCOUNTER — NON-APPOINTMENT (OUTPATIENT)
Age: 59
End: 2024-09-24

## 2024-09-24 ENCOUNTER — APPOINTMENT (OUTPATIENT)
Dept: INTERNAL MEDICINE | Facility: CLINIC | Age: 59
End: 2024-09-24
Payer: COMMERCIAL

## 2024-09-24 ENCOUNTER — RX RENEWAL (OUTPATIENT)
Age: 59
End: 2024-09-24

## 2024-09-24 VITALS
TEMPERATURE: 98.2 F | HEART RATE: 79 BPM | OXYGEN SATURATION: 98 % | WEIGHT: 245 LBS | DIASTOLIC BLOOD PRESSURE: 82 MMHG | SYSTOLIC BLOOD PRESSURE: 130 MMHG | BODY MASS INDEX: 37.13 KG/M2 | HEIGHT: 68 IN

## 2024-09-24 DIAGNOSIS — I10 ESSENTIAL (PRIMARY) HYPERTENSION: ICD-10-CM

## 2024-09-24 DIAGNOSIS — Z87.39 PERSONAL HISTORY OF OTHER DISEASES OF THE MUSCULOSKELETAL SYSTEM AND CONNECTIVE TISSUE: ICD-10-CM

## 2024-09-24 DIAGNOSIS — M25.50 PAIN IN UNSPECIFIED JOINT: ICD-10-CM

## 2024-09-24 DIAGNOSIS — K63.5 POLYP OF COLON: ICD-10-CM

## 2024-09-24 DIAGNOSIS — Z86.39 PERSONAL HISTORY OF OTHER ENDOCRINE, NUTRITIONAL AND METABOLIC DISEASE: ICD-10-CM

## 2024-09-24 DIAGNOSIS — Z87.898 PERSONAL HISTORY OF OTHER SPECIFIED CONDITIONS: ICD-10-CM

## 2024-09-24 DIAGNOSIS — J98.01 ACUTE BRONCHOSPASM: ICD-10-CM

## 2024-09-24 DIAGNOSIS — R06.09 OTHER FORMS OF DYSPNEA: ICD-10-CM

## 2024-09-24 DIAGNOSIS — R92.8 OTHER ABNORMAL AND INCONCLUSIVE FINDINGS ON DIAGNOSTIC IMAGING OF BREAST: ICD-10-CM

## 2024-09-24 DIAGNOSIS — Z00.00 ENCOUNTER FOR GENERAL ADULT MEDICAL EXAMINATION W/OUT ABNORMAL FINDINGS: ICD-10-CM

## 2024-09-24 DIAGNOSIS — R03.0 ELEVATED BLOOD-PRESSURE READING, W/OUT DIAGNOSIS OF HYPERTENSION: ICD-10-CM

## 2024-09-24 DIAGNOSIS — Z87.19 PERSONAL HISTORY OF OTHER DISEASES OF THE DIGESTIVE SYSTEM: ICD-10-CM
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
## 2024-09-24 DIAGNOSIS — M06.00 RHEUMATOID ARTHRITIS W/OUT RHEUMATOID FACTOR, UNSPECIFIED SITE: ICD-10-CM

## 2024-09-24 PROCEDURE — 93000 ELECTROCARDIOGRAM COMPLETE: CPT

## 2024-09-24 PROCEDURE — 90656 IIV3 VACC NO PRSV 0.5 ML IM: CPT

## 2024-09-24 PROCEDURE — 99396 PREV VISIT EST AGE 40-64: CPT | Mod: 25

## 2024-09-24 PROCEDURE — 36415 COLL VENOUS BLD VENIPUNCTURE: CPT

## 2024-09-24 PROCEDURE — G0008: CPT

## 2024-09-24 NOTE — HEALTH RISK ASSESSMENT
[No] : In the past 12 months have you used drugs other than those required for medical reasons? No [0] : 2) Feeling down, depressed, or hopeless: Not at all (0) [Never] : Never [No falls in past year] : Patient reported no falls in the past year [PHQ-2 Negative - No further assessment needed] : PHQ-2 Negative - No further assessment needed [10-14] : 10-14 [> 15 Years] : > 15 Years [NO] : No [None] : None [With Family] : lives with family [] :  [Smoke Detector] : smoke detector [Carbon Monoxide Detector] : carbon monoxide detector [Seat Belt] :  uses seat belt [Sunscreen] : uses sunscreen [With Patient/Caregiver] : , with patient/caregiver [Patient reported bone density results were normal] : Patient reported bone density results were normal [[id number]] : 0 [EyeExamDate] : 01/2023 [Change in mental status noted] : No change in mental status noted [Reports changes in hearing] : Reports no changes in hearing [MammogramDate] : 06/2024 [PapSmearDate] : 08/2024 [BoneDensityDate] : 1/1/20 [ColonoscopyDate] : 06/2024 [AdvancecareDate] : 9/24/24

## 2024-09-24 NOTE — PHYSICAL EXAM
[No Acute Distress] : no acute distress [Well Nourished] : well nourished [Well Developed] : well developed [Well-Appearing] : well-appearing [Normal Sclera/Conjunctiva] : normal sclera/conjunctiva [PERRL] : pupils equal round and reactive to light [EOMI] : extraocular movements intact [Normal Outer Ear/Nose] : the outer ears and nose were normal in appearance [Normal Oropharynx] : the oropharynx was normal [No JVD] : no jugular venous distention [No Lymphadenopathy] : no lymphadenopathy [Supple] : supple [Thyroid Normal, No Nodules] : the thyroid was normal and there were no nodules present [No Respiratory Distress] : no respiratory distress  [No Accessory Muscle Use] : no accessory muscle use [Clear to Auscultation] : lungs were clear to auscultation bilaterally [Normal Rate] : normal rate  [Regular Rhythm] : with a regular rhythm [Normal S1, S2] : normal S1 and S2 [No Murmur] : no murmur heard [No Carotid Bruits] : no carotid bruits [No Abdominal Bruit] : a ~M bruit was not heard ~T in the abdomen [No Varicosities] : no varicosities [Pedal Pulses Present] : the pedal pulses are present [No Edema] : there was no peripheral edema [No Palpable Aorta] : no palpable aorta [No Extremity Clubbing/Cyanosis] : no extremity clubbing/cyanosis [Soft] : abdomen soft [Non Tender] : non-tender [Non-distended] : non-distended [No HSM] : no HSM [Normal Bowel Sounds] : normal bowel sounds [Normal Posterior Cervical Nodes] : no posterior cervical lymphadenopathy [Normal Anterior Cervical Nodes] : no anterior cervical lymphadenopathy [No CVA Tenderness] : no CVA  tenderness [No Spinal Tenderness] : no spinal tenderness [No Joint Swelling] : no joint swelling [Grossly Normal Strength/Tone] : grossly normal strength/tone [No Rash] : no rash [Coordination Grossly Intact] : coordination grossly intact [No Focal Deficits] : no focal deficits [Normal Gait] : normal gait [Deep Tendon Reflexes (DTR)] : deep tendon reflexes were 2+ and symmetric [Normal Affect] : the affect was normal [Normal Insight/Judgement] : insight and judgment were intact [No Masses] : no palpable masses [No Nipple Discharge] : no nipple discharge [No Axillary Lymphadenopathy] : no axillary lymphadenopathy [FreeTextEntry1] : defreed to colon

## 2024-09-24 NOTE — ASSESSMENT
[FreeTextEntry1] : All preventative measures were reviewed with the patient and the patient is due for and agrees to the following as outlined  in the plan  below. Appropriate labs were drawn in this office today. flu shot  today\advised rsv and covid vax

## 2024-09-25 ENCOUNTER — NON-APPOINTMENT (OUTPATIENT)
Age: 59
End: 2024-09-25

## 2024-09-25 LAB
25(OH)D3 SERPL-MCNC: 22.5 NG/ML
ALBUMIN SERPL ELPH-MCNC: 4.2 G/DL
ALP BLD-CCNC: 110 U/L
ALT SERPL-CCNC: 33 U/L
ANION GAP SERPL CALC-SCNC: 13 MMOL/L
APPEARANCE: CLEAR
AST SERPL-CCNC: 19 U/L
BACTERIA: NEGATIVE /HPF
BASOPHILS # BLD AUTO: 0.03 K/UL
BASOPHILS NFR BLD AUTO: 0.4 %
BILIRUB DIRECT SERPL-MCNC: 0.1 MG/DL
BILIRUB SERPL-MCNC: 0.5 MG/DL
BILIRUBIN URINE: NEGATIVE
BLOOD URINE: NEGATIVE
BUN SERPL-MCNC: 18 MG/DL
CALCIUM SERPL-MCNC: 9.6 MG/DL
CAST: 1 /LPF
CHLORIDE SERPL-SCNC: 103 MMOL/L
CHOLEST SERPL-MCNC: 188 MG/DL
CO2 SERPL-SCNC: 24 MMOL/L
COLOR: NORMAL
CREAT SERPL-MCNC: 0.77 MG/DL
EGFR: 88 ML/MIN/1.73M2
EOSINOPHIL # BLD AUTO: 0.14 K/UL
EOSINOPHIL NFR BLD AUTO: 1.9 %
EPITHELIAL CELLS: 8 /HPF
ESTIMATED AVERAGE GLUCOSE: 97 MG/DL
FERRITIN SERPL-MCNC: 279 NG/ML
GLUCOSE QUALITATIVE U: NEGATIVE MG/DL
GLUCOSE SERPL-MCNC: 90 MG/DL
HBA1C MFR BLD HPLC: 5 %
HCT VFR BLD CALC: 39 %
HCV AB SER QL: NONREACTIVE
HCV S/CO RATIO: 0.16 S/CO
HDLC SERPL-MCNC: 36 MG/DL
HGB BLD-MCNC: 12.5 G/DL
IMM GRANULOCYTES NFR BLD AUTO: 0.3 %
IRON SERPL-MCNC: 56 UG/DL
KETONES URINE: ABNORMAL MG/DL
LDLC SERPL CALC-MCNC: 117 MG/DL
LEUKOCYTE ESTERASE URINE: NEGATIVE
LYMPHOCYTES # BLD AUTO: 1.53 K/UL
LYMPHOCYTES NFR BLD AUTO: 20.3 %
MAN DIFF?: NORMAL
MCHC RBC-ENTMCNC: 26.9 PG
MCHC RBC-ENTMCNC: 32.1 GM/DL
MCV RBC AUTO: 84.1 FL
MICROSCOPIC-UA: NORMAL
MONOCYTES # BLD AUTO: 0.53 K/UL
MONOCYTES NFR BLD AUTO: 7 %
NEUTROPHILS # BLD AUTO: 5.29 K/UL
NEUTROPHILS NFR BLD AUTO: 70.1 %
NITRITE URINE: NEGATIVE
NONHDLC SERPL-MCNC: 153 MG/DL
PH URINE: 5.5
PLATELET # BLD AUTO: 227 K/UL
POTASSIUM SERPL-SCNC: 3.9 MMOL/L
PROT SERPL-MCNC: 6.8 G/DL
PROTEIN URINE: NORMAL MG/DL
RBC # BLD: 4.64 M/UL
RBC # FLD: 14.8 %
RED BLOOD CELLS URINE: 1 /HPF
SODIUM SERPL-SCNC: 140 MMOL/L
SPECIFIC GRAVITY URINE: 1.03
T4 SERPL-MCNC: 9.5 UG/DL
TRIGL SERPL-MCNC: 200 MG/DL
TSH SERPL-ACNC: 1.35 UIU/ML
UROBILINOGEN URINE: 0.2 MG/DL
WBC # FLD AUTO: 7.54 K/UL
WHITE BLOOD CELLS URINE: 1 /HPF

## 2024-09-30 DIAGNOSIS — E78.5 HYPERLIPIDEMIA, UNSPECIFIED: ICD-10-CM

## 2024-09-30 DIAGNOSIS — E55.9 VITAMIN D DEFICIENCY, UNSPECIFIED: ICD-10-CM

## 2024-10-07 ENCOUNTER — OUTPATIENT (OUTPATIENT)
Dept: OUTPATIENT SERVICES | Facility: HOSPITAL | Age: 59
LOS: 1 days | End: 2024-10-07
Payer: COMMERCIAL

## 2024-10-07 ENCOUNTER — APPOINTMENT (OUTPATIENT)
Dept: ULTRASOUND IMAGING | Facility: CLINIC | Age: 59
End: 2024-10-07

## 2024-10-07 DIAGNOSIS — I10 ESSENTIAL (PRIMARY) HYPERTENSION: ICD-10-CM

## 2024-10-07 DIAGNOSIS — Z00.8 ENCOUNTER FOR OTHER GENERAL EXAMINATION: ICD-10-CM

## 2024-10-07 PROCEDURE — 93880 EXTRACRANIAL BILAT STUDY: CPT | Mod: 26

## 2024-10-07 PROCEDURE — 93880 EXTRACRANIAL BILAT STUDY: CPT

## 2024-10-09 DIAGNOSIS — I65.29 OCCLUSION AND STENOSIS OF UNSPECIFIED CAROTID ARTERY: ICD-10-CM

## 2024-10-09 RX ORDER — ROSUVASTATIN CALCIUM 5 MG/1
5 TABLET, FILM COATED ORAL
Qty: 90 | Refills: 1 | Status: ACTIVE | COMMUNITY
Start: 2024-10-09 | End: 1900-01-01

## 2024-10-16 ENCOUNTER — OUTPATIENT (OUTPATIENT)
Dept: OUTPATIENT SERVICES | Facility: HOSPITAL | Age: 59
LOS: 1 days | End: 2024-10-16
Payer: COMMERCIAL

## 2024-10-16 ENCOUNTER — APPOINTMENT (OUTPATIENT)
Dept: RHEUMATOLOGY | Facility: CLINIC | Age: 59
End: 2024-10-16
Payer: COMMERCIAL

## 2024-10-16 ENCOUNTER — APPOINTMENT (OUTPATIENT)
Dept: RADIOLOGY | Facility: CLINIC | Age: 59
End: 2024-10-16
Payer: COMMERCIAL

## 2024-10-16 VITALS
HEART RATE: 78 BPM | HEIGHT: 68 IN | OXYGEN SATURATION: 99 % | BODY MASS INDEX: 36.98 KG/M2 | DIASTOLIC BLOOD PRESSURE: 78 MMHG | TEMPERATURE: 98 F | WEIGHT: 244 LBS | SYSTOLIC BLOOD PRESSURE: 124 MMHG

## 2024-10-16 DIAGNOSIS — M25.551 PAIN IN RIGHT HIP: ICD-10-CM

## 2024-10-16 DIAGNOSIS — M25.50 PAIN IN UNSPECIFIED JOINT: ICD-10-CM

## 2024-10-16 DIAGNOSIS — M06.00 RHEUMATOID ARTHRITIS W/OUT RHEUMATOID FACTOR, UNSPECIFIED SITE: ICD-10-CM

## 2024-10-16 PROCEDURE — 73522 X-RAY EXAM HIPS BI 3-4 VIEWS: CPT

## 2024-10-16 PROCEDURE — 72110 X-RAY EXAM L-2 SPINE 4/>VWS: CPT

## 2024-10-16 PROCEDURE — 72110 X-RAY EXAM L-2 SPINE 4/>VWS: CPT | Mod: 26

## 2024-10-16 PROCEDURE — 73522 X-RAY EXAM HIPS BI 3-4 VIEWS: CPT | Mod: 26

## 2024-10-16 PROCEDURE — 99214 OFFICE O/P EST MOD 30 MIN: CPT

## 2024-10-24 ENCOUNTER — APPOINTMENT (OUTPATIENT)
Dept: ORTHOPEDIC SURGERY | Facility: CLINIC | Age: 59
End: 2024-10-24
Payer: COMMERCIAL

## 2024-10-24 VITALS — WEIGHT: 245 LBS | HEIGHT: 68 IN | BODY MASS INDEX: 37.13 KG/M2

## 2024-10-24 PROCEDURE — 99204 OFFICE O/P NEW MOD 45 MIN: CPT

## 2024-10-24 RX ORDER — METHYLPREDNISOLONE 4 MG/1
4 TABLET ORAL
Qty: 1 | Refills: 0 | Status: ACTIVE | COMMUNITY
Start: 2024-10-24 | End: 1900-01-01

## 2024-11-08 ENCOUNTER — APPOINTMENT (OUTPATIENT)
Dept: MRI IMAGING | Facility: CLINIC | Age: 59
End: 2024-11-08

## 2024-11-08 ENCOUNTER — OUTPATIENT (OUTPATIENT)
Dept: OUTPATIENT SERVICES | Facility: HOSPITAL | Age: 59
LOS: 1 days | End: 2024-11-08
Payer: COMMERCIAL

## 2024-11-08 DIAGNOSIS — M25.551 PAIN IN RIGHT HIP: ICD-10-CM

## 2024-11-08 PROCEDURE — 72195 MRI PELVIS W/O DYE: CPT | Mod: 26

## 2024-11-08 PROCEDURE — 72195 MRI PELVIS W/O DYE: CPT

## 2024-11-13 ENCOUNTER — APPOINTMENT (OUTPATIENT)
Dept: ORTHOPEDIC SURGERY | Facility: CLINIC | Age: 59
End: 2024-11-13
Payer: COMMERCIAL

## 2024-11-13 PROCEDURE — 99213 OFFICE O/P EST LOW 20 MIN: CPT

## 2024-11-21 ENCOUNTER — APPOINTMENT (OUTPATIENT)
Dept: SURGERY | Facility: CLINIC | Age: 59
End: 2024-11-21
Payer: COMMERCIAL

## 2024-11-21 ENCOUNTER — APPOINTMENT (OUTPATIENT)
Dept: ORTHOPEDIC SURGERY | Facility: CLINIC | Age: 59
End: 2024-11-21
Payer: COMMERCIAL

## 2024-11-21 VITALS — WEIGHT: 245 LBS | BODY MASS INDEX: 37.13 KG/M2 | HEIGHT: 68 IN

## 2024-11-21 DIAGNOSIS — S73.192A OTHER SPRAIN OF LEFT HIP, INITIAL ENCOUNTER: ICD-10-CM

## 2024-11-21 DIAGNOSIS — S73.191A OTHER SPRAIN OF RIGHT HIP, INITIAL ENCOUNTER: ICD-10-CM

## 2024-11-21 PROCEDURE — 99213 OFFICE O/P EST LOW 20 MIN: CPT

## 2024-11-21 PROCEDURE — 99214 OFFICE O/P EST MOD 30 MIN: CPT

## 2024-11-21 NOTE — ED ADULT TRIAGE NOTE - CHIEF COMPLAINT QUOTE
Communication with Dr. Mancuso via phone. Reported to MD that labs are WNL, discussed US report, patient denies vaginal bleeding, LOF, cramping, ctx or pain. Reviewed FHR with patient and toco activity including difficulty assess FHR for continuous of 15 mins, RN was able to get 9 mins of continuous and -155 with doptones.     Notified MD that patient will be traveling to [place] tomorrow.     TORB to discharge home.    vomiting multiple times since 7:30PM. c/o intermittent sharp/dull mid epigastric abdominal pain

## 2024-11-22 RX ORDER — TIRZEPATIDE 7.5 MG/.5ML
7.5 INJECTION, SOLUTION SUBCUTANEOUS
Qty: 4 | Refills: 0 | Status: ACTIVE | COMMUNITY
Start: 2024-11-21

## 2024-11-25 ENCOUNTER — APPOINTMENT (OUTPATIENT)
Dept: SURGERY | Facility: CLINIC | Age: 59
End: 2024-11-25

## 2024-11-25 PROCEDURE — 97802 MEDICAL NUTRITION INDIV IN: CPT | Mod: 95

## 2024-12-09 ENCOUNTER — RESULT REVIEW (OUTPATIENT)
Age: 59
End: 2024-12-09

## 2024-12-17 ENCOUNTER — OUTPATIENT (OUTPATIENT)
Dept: OUTPATIENT SERVICES | Facility: HOSPITAL | Age: 59
LOS: 1 days | End: 2024-12-17
Payer: COMMERCIAL

## 2024-12-17 ENCOUNTER — RESULT REVIEW (OUTPATIENT)
Age: 59
End: 2024-12-17

## 2024-12-17 ENCOUNTER — APPOINTMENT (OUTPATIENT)
Dept: ULTRASOUND IMAGING | Facility: CLINIC | Age: 59
End: 2024-12-17
Payer: COMMERCIAL

## 2024-12-17 DIAGNOSIS — S73.192A OTHER SPRAIN OF LEFT HIP, INITIAL ENCOUNTER: ICD-10-CM

## 2024-12-17 DIAGNOSIS — S73.191A OTHER SPRAIN OF RIGHT HIP, INITIAL ENCOUNTER: ICD-10-CM

## 2024-12-17 PROCEDURE — 20611 DRAIN/INJ JOINT/BURSA W/US: CPT | Mod: LT

## 2024-12-17 PROCEDURE — 20611 DRAIN/INJ JOINT/BURSA W/US: CPT

## 2024-12-19 NOTE — ED ADULT NURSE NOTE - DOES PATIENT HAVE ADVANCE DIRECTIVE
Dee Carpenter Patient Age: 57 year old  MESSAGE: Interpreting service used: No    Insurance on file confirmed with caller: Yes    Obstetrics & Gynecology- Reason for call: Work In Appointment Request- Post-Op Visit-        Provider: Dr. Bullock    Patient needs to be seen for a post-op visit.    Date of surgery: 12/6/2024    Timeframe:  ASAP   (route message HIGH PRIORITY for same day/next day requests)    Patient prefers to be seen at: [facility]      Please advise a work in date and time.          Is patient currently having symptoms?  No- Route message to provider's clinical support pool.    Message read back to caller for accuracy: Yes       ALLERGIES:  Patient has no known allergies.  Current Outpatient Medications   Medication Sig Dispense Refill    ibuprofen (MOTRIN) 600 MG tablet Take 1 tablet by mouth every 6 hours as needed for Pain. 30 tablet 2    acetaminophen (TYLENOL) 500 MG tablet Take 2 tablets by mouth every 8 hours as needed for Pain.      hydroCHLOROthiazide 25 MG tablet Take 1 tablet by mouth daily. 90 tablet 3    flecainide (TAMBOCOR) 50 MG tablet Take 1 tablet by mouth in the morning and 1 tablet in the evening. 60 tablet 3    metoPROLOL succinate (TOPROL-XL) 25 MG 24 hr tablet Take 1 tablet by mouth daily. 90 tablet 3    Multiple Vitamins-Minerals (MULTIVITAMIN ADULTS PO) Take by mouth daily.       No current facility-administered medications for this visit.     PHARMACY to use:           Pharmacy preference(s) on file:   [id number] - [place], [place] - [address] AT [facility] [address]  [address]  [facility] [address]  Phone: [phone] Fax: [phone]      CALL BACK INFO: Ok to leave response (including medical information) on answering machine      PCP: Sol Anderson MD         INS: Payor:  [employer] / Plan:  BE BP DRP10 / Product Type: JOSE J PLATINUM   PATIENT ADDRESS:  [address] [place] [address] [place] [address]     
Unfortunately, there are no apts at [facility].   Trever Mitchell RN  need to be adjusted to [facility] patient's for 12/27/2024.   Please call patient and assist in scheduling.     
No

## 2025-01-07 ENCOUNTER — APPOINTMENT (OUTPATIENT)
Dept: INTERNAL MEDICINE | Facility: CLINIC | Age: 60
End: 2025-01-07
Payer: COMMERCIAL

## 2025-01-07 VITALS
HEART RATE: 89 BPM | OXYGEN SATURATION: 97 % | DIASTOLIC BLOOD PRESSURE: 88 MMHG | WEIGHT: 242 LBS | BODY MASS INDEX: 36.68 KG/M2 | TEMPERATURE: 98 F | HEIGHT: 68 IN | SYSTOLIC BLOOD PRESSURE: 130 MMHG

## 2025-01-07 VITALS — SYSTOLIC BLOOD PRESSURE: 122 MMHG | DIASTOLIC BLOOD PRESSURE: 80 MMHG

## 2025-01-07 DIAGNOSIS — E78.5 HYPERLIPIDEMIA, UNSPECIFIED: ICD-10-CM

## 2025-01-07 DIAGNOSIS — I65.29 OCCLUSION AND STENOSIS OF UNSPECIFIED CAROTID ARTERY: ICD-10-CM

## 2025-01-07 DIAGNOSIS — I10 ESSENTIAL (PRIMARY) HYPERTENSION: ICD-10-CM

## 2025-01-07 PROCEDURE — 99214 OFFICE O/P EST MOD 30 MIN: CPT

## 2025-01-10 ENCOUNTER — APPOINTMENT (OUTPATIENT)
Dept: INTERNAL MEDICINE | Facility: CLINIC | Age: 60
End: 2025-01-10

## 2025-01-10 LAB
CHOLEST SERPL-MCNC: 169 MG/DL
HDLC SERPL-MCNC: 41 MG/DL
LDLC SERPL CALC-MCNC: 101 MG/DL
NONHDLC SERPL-MCNC: 128 MG/DL
TRIGL SERPL-MCNC: 150 MG/DL

## 2025-01-13 ENCOUNTER — NON-APPOINTMENT (OUTPATIENT)
Age: 60
End: 2025-01-13

## 2025-01-21 ENCOUNTER — APPOINTMENT (OUTPATIENT)
Dept: SURGERY | Facility: CLINIC | Age: 60
End: 2025-01-21
Payer: COMMERCIAL

## 2025-01-21 VITALS — HEIGHT: 68 IN | WEIGHT: 242 LBS | BODY MASS INDEX: 36.68 KG/M2

## 2025-01-21 PROCEDURE — 99213 OFFICE O/P EST LOW 20 MIN: CPT | Mod: 95

## 2025-01-22 ENCOUNTER — APPOINTMENT (OUTPATIENT)
Dept: RHEUMATOLOGY | Facility: CLINIC | Age: 60
End: 2025-01-22
Payer: COMMERCIAL

## 2025-01-22 VITALS
DIASTOLIC BLOOD PRESSURE: 78 MMHG | BODY MASS INDEX: 36.68 KG/M2 | HEART RATE: 89 BPM | HEIGHT: 68 IN | SYSTOLIC BLOOD PRESSURE: 118 MMHG | WEIGHT: 242 LBS | TEMPERATURE: 97.6 F | OXYGEN SATURATION: 99 %

## 2025-01-22 PROCEDURE — 99214 OFFICE O/P EST MOD 30 MIN: CPT

## 2025-02-18 ENCOUNTER — NON-APPOINTMENT (OUTPATIENT)
Age: 60
End: 2025-02-18

## 2025-03-25 ENCOUNTER — NON-APPOINTMENT (OUTPATIENT)
Age: 60
End: 2025-03-25

## 2025-03-25 ENCOUNTER — APPOINTMENT (OUTPATIENT)
Dept: DERMATOLOGY | Facility: CLINIC | Age: 60
End: 2025-03-25
Payer: COMMERCIAL

## 2025-03-25 DIAGNOSIS — D22.9 MELANOCYTIC NEVI, UNSPECIFIED: ICD-10-CM

## 2025-03-25 DIAGNOSIS — L81.4 OTHER MELANIN HYPERPIGMENTATION: ICD-10-CM

## 2025-03-25 DIAGNOSIS — D23.9 OTHER BENIGN NEOPLASM OF SKIN, UNSPECIFIED: ICD-10-CM

## 2025-03-25 PROCEDURE — 99203 OFFICE O/P NEW LOW 30 MIN: CPT

## 2025-04-08 ENCOUNTER — APPOINTMENT (OUTPATIENT)
Dept: RADIOLOGY | Facility: CLINIC | Age: 60
End: 2025-04-08
Payer: COMMERCIAL

## 2025-04-08 ENCOUNTER — APPOINTMENT (OUTPATIENT)
Dept: INTERNAL MEDICINE | Facility: CLINIC | Age: 60
End: 2025-04-08
Payer: COMMERCIAL

## 2025-04-08 ENCOUNTER — OUTPATIENT (OUTPATIENT)
Dept: OUTPATIENT SERVICES | Facility: HOSPITAL | Age: 60
LOS: 1 days | End: 2025-04-08
Payer: COMMERCIAL

## 2025-04-08 VITALS
DIASTOLIC BLOOD PRESSURE: 84 MMHG | SYSTOLIC BLOOD PRESSURE: 118 MMHG | WEIGHT: 226 LBS | TEMPERATURE: 97.6 F | HEART RATE: 83 BPM | HEIGHT: 68 IN | BODY MASS INDEX: 34.25 KG/M2 | OXYGEN SATURATION: 98 %

## 2025-04-08 DIAGNOSIS — M25.571 PAIN IN RIGHT ANKLE AND JOINTS OF RIGHT FOOT: ICD-10-CM

## 2025-04-08 DIAGNOSIS — Z91.81 HISTORY OF FALLING: ICD-10-CM

## 2025-04-08 DIAGNOSIS — E78.5 HYPERLIPIDEMIA, UNSPECIFIED: ICD-10-CM

## 2025-04-08 DIAGNOSIS — E55.9 VITAMIN D DEFICIENCY, UNSPECIFIED: ICD-10-CM

## 2025-04-08 DIAGNOSIS — I10 ESSENTIAL (PRIMARY) HYPERTENSION: ICD-10-CM

## 2025-04-08 DIAGNOSIS — I65.29 OCCLUSION AND STENOSIS OF UNSPECIFIED CAROTID ARTERY: ICD-10-CM

## 2025-04-08 PROCEDURE — 99214 OFFICE O/P EST MOD 30 MIN: CPT

## 2025-04-08 PROCEDURE — 73610 X-RAY EXAM OF ANKLE: CPT | Mod: 26,RT

## 2025-04-08 PROCEDURE — 73610 X-RAY EXAM OF ANKLE: CPT

## 2025-04-10 ENCOUNTER — APPOINTMENT (OUTPATIENT)
Dept: SURGERY | Facility: CLINIC | Age: 60
End: 2025-04-10
Payer: COMMERCIAL

## 2025-04-10 VITALS — HEIGHT: 68 IN | WEIGHT: 223 LBS | BODY MASS INDEX: 33.8 KG/M2

## 2025-04-10 PROCEDURE — 99213 OFFICE O/P EST LOW 20 MIN: CPT | Mod: 95

## 2025-07-02 ENCOUNTER — APPOINTMENT (OUTPATIENT)
Dept: RHEUMATOLOGY | Facility: CLINIC | Age: 60
End: 2025-07-02
Payer: COMMERCIAL

## 2025-07-02 VITALS
DIASTOLIC BLOOD PRESSURE: 72 MMHG | HEIGHT: 68 IN | BODY MASS INDEX: 31.98 KG/M2 | HEART RATE: 89 BPM | WEIGHT: 211 LBS | OXYGEN SATURATION: 99 % | SYSTOLIC BLOOD PRESSURE: 118 MMHG | TEMPERATURE: 97.6 F

## 2025-07-02 PROCEDURE — 99214 OFFICE O/P EST MOD 30 MIN: CPT

## 2025-07-10 ENCOUNTER — APPOINTMENT (OUTPATIENT)
Dept: INTERNAL MEDICINE | Facility: CLINIC | Age: 60
End: 2025-07-10
Payer: COMMERCIAL

## 2025-07-10 VITALS
RESPIRATION RATE: 16 BRPM | DIASTOLIC BLOOD PRESSURE: 86 MMHG | TEMPERATURE: 97.6 F | WEIGHT: 202 LBS | SYSTOLIC BLOOD PRESSURE: 161 MMHG | HEART RATE: 75 BPM | HEIGHT: 67 IN | OXYGEN SATURATION: 98 % | BODY MASS INDEX: 31.71 KG/M2

## 2025-07-10 PROBLEM — G47.9 SLEEP DISORDER: Status: ACTIVE | Noted: 2025-07-10

## 2025-07-10 PROBLEM — G25.81 RESTLESS LEG SYNDROME: Status: ACTIVE | Noted: 2025-07-10

## 2025-07-10 PROBLEM — F51.04 CHRONIC INSOMNIA: Status: ACTIVE | Noted: 2025-07-10

## 2025-07-10 PROBLEM — G47.00 INSOMNIA, UNSPECIFIED TYPE: Status: ACTIVE | Noted: 2025-07-10

## 2025-07-10 PROCEDURE — 99244 OFF/OP CNSLTJ NEW/EST MOD 40: CPT | Mod: 25

## 2025-07-10 PROCEDURE — ZZZZZ: CPT

## 2025-07-10 PROCEDURE — 94727 GAS DIL/WSHOT DETER LNG VOL: CPT

## 2025-07-10 PROCEDURE — 94060 EVALUATION OF WHEEZING: CPT

## 2025-07-10 PROCEDURE — 94729 DIFFUSING CAPACITY: CPT

## 2025-07-11 ENCOUNTER — APPOINTMENT (OUTPATIENT)
Age: 60
End: 2025-07-11
Payer: COMMERCIAL

## 2025-07-11 VITALS
HEIGHT: 67 IN | OXYGEN SATURATION: 97 % | WEIGHT: 212 LBS | SYSTOLIC BLOOD PRESSURE: 118 MMHG | TEMPERATURE: 97.7 F | HEART RATE: 75 BPM | DIASTOLIC BLOOD PRESSURE: 70 MMHG | BODY MASS INDEX: 33.27 KG/M2

## 2025-07-11 VITALS — DIASTOLIC BLOOD PRESSURE: 68 MMHG | SYSTOLIC BLOOD PRESSURE: 112 MMHG

## 2025-07-11 PROCEDURE — 36415 COLL VENOUS BLD VENIPUNCTURE: CPT

## 2025-07-11 PROCEDURE — 99214 OFFICE O/P EST MOD 30 MIN: CPT

## 2025-07-14 LAB
ALBUMIN SERPL ELPH-MCNC: 4.3 G/DL
ALP BLD-CCNC: 85 U/L
ALT SERPL-CCNC: 24 U/L
ANION GAP SERPL CALC-SCNC: 10 MMOL/L
AST SERPL-CCNC: 19 U/L
BILIRUB SERPL-MCNC: 0.5 MG/DL
BUN SERPL-MCNC: 16 MG/DL
CALCIUM SERPL-MCNC: 9.4 MG/DL
CHLORIDE SERPL-SCNC: 106 MMOL/L
CO2 SERPL-SCNC: 25 MMOL/L
CREAT SERPL-MCNC: 0.74 MG/DL
EGFRCR SERPLBLD CKD-EPI 2021: 91 ML/MIN/1.73M2
GLUCOSE SERPL-MCNC: 90 MG/DL
POTASSIUM SERPL-SCNC: 4.8 MMOL/L
PROT SERPL-MCNC: 6.8 G/DL
SODIUM SERPL-SCNC: 140 MMOL/L

## 2025-07-23 ENCOUNTER — OUTPATIENT (OUTPATIENT)
Dept: OUTPATIENT SERVICES | Facility: HOSPITAL | Age: 60
LOS: 1 days | End: 2025-07-23

## 2025-07-23 DIAGNOSIS — G47.33 OBSTRUCTIVE SLEEP APNEA (ADULT) (PEDIATRIC): ICD-10-CM

## 2025-07-23 PROCEDURE — 95810 POLYSOM 6/> YRS 4/> PARAM: CPT | Mod: 26

## 2025-07-31 ENCOUNTER — APPOINTMENT (OUTPATIENT)
Dept: SURGERY | Facility: CLINIC | Age: 60
End: 2025-07-31
Payer: COMMERCIAL

## 2025-07-31 VITALS — HEIGHT: 67 IN | WEIGHT: 207 LBS | BODY MASS INDEX: 32.49 KG/M2

## 2025-07-31 PROCEDURE — 99213 OFFICE O/P EST LOW 20 MIN: CPT | Mod: 95

## 2025-08-07 ENCOUNTER — APPOINTMENT (OUTPATIENT)
Dept: INTERNAL MEDICINE | Facility: CLINIC | Age: 60
End: 2025-08-07
Payer: COMMERCIAL

## 2025-08-07 VITALS
HEART RATE: 74 BPM | BODY MASS INDEX: 32.49 KG/M2 | HEIGHT: 67 IN | SYSTOLIC BLOOD PRESSURE: 108 MMHG | DIASTOLIC BLOOD PRESSURE: 68 MMHG | TEMPERATURE: 97.7 F | OXYGEN SATURATION: 97 % | WEIGHT: 207 LBS

## 2025-08-07 DIAGNOSIS — K57.32 DIVERTICULITIS OF LARGE INTESTINE W/OUT PERFORATION OR ABSCESS W/OUT BLEEDING: ICD-10-CM

## 2025-08-07 LAB
BILIRUB UR QL STRIP: NEGATIVE
GLUCOSE UR-MCNC: NEGATIVE
HCG UR QL: 0.2 EU/DL
HGB UR QL STRIP.AUTO: NEGATIVE
KETONES UR-MCNC: NEGATIVE
LEUKOCYTE ESTERASE UR QL STRIP: NEGATIVE
NITRITE UR QL STRIP: NEGATIVE
PH UR STRIP: 6.5
PROT UR STRIP-MCNC: NEGATIVE
SP GR UR STRIP: 1.01

## 2025-08-07 PROCEDURE — 99214 OFFICE O/P EST MOD 30 MIN: CPT

## 2025-08-07 PROCEDURE — 81003 URINALYSIS AUTO W/O SCOPE: CPT | Mod: QW

## 2025-08-07 PROCEDURE — 36415 COLL VENOUS BLD VENIPUNCTURE: CPT

## 2025-08-07 RX ORDER — AMOXICILLIN AND CLAVULANATE POTASSIUM 875; 125 MG/1; MG/1
875-125 TABLET, COATED ORAL
Qty: 20 | Refills: 0 | Status: ACTIVE | COMMUNITY
Start: 2025-08-07 | End: 1900-01-01

## 2025-08-08 ENCOUNTER — RESULT REVIEW (OUTPATIENT)
Age: 60
End: 2025-08-08

## 2025-08-08 ENCOUNTER — APPOINTMENT (OUTPATIENT)
Dept: CT IMAGING | Facility: CLINIC | Age: 60
End: 2025-08-08
Payer: COMMERCIAL

## 2025-08-08 ENCOUNTER — OUTPATIENT (OUTPATIENT)
Dept: OUTPATIENT SERVICES | Facility: HOSPITAL | Age: 60
LOS: 1 days | End: 2025-08-08
Payer: COMMERCIAL

## 2025-08-08 ENCOUNTER — TRANSCRIPTION ENCOUNTER (OUTPATIENT)
Age: 60
End: 2025-08-08

## 2025-08-08 DIAGNOSIS — K57.32 DIVERTICULITIS OF LARGE INTESTINE WITHOUT PERFORATION OR ABSCESS WITHOUT BLEEDING: ICD-10-CM

## 2025-08-08 LAB
ALBUMIN SERPL ELPH-MCNC: 4.1 G/DL
ALP BLD-CCNC: 102 U/L
ALT SERPL-CCNC: 40 U/L
AMYLASE/CREAT SERPL: 57 U/L
ANION GAP SERPL CALC-SCNC: 15 MMOL/L
APPEARANCE: CLEAR
AST SERPL-CCNC: 45 U/L
BASOPHILS # BLD AUTO: 0.03 K/UL
BASOPHILS NFR BLD AUTO: 0.3 %
BILIRUB SERPL-MCNC: 0.4 MG/DL
BILIRUBIN URINE: NEGATIVE
BLOOD URINE: NEGATIVE
BUN SERPL-MCNC: 17 MG/DL
CALCIUM SERPL-MCNC: 9.3 MG/DL
CHLORIDE SERPL-SCNC: 102 MMOL/L
CO2 SERPL-SCNC: 18 MMOL/L
COLOR: YELLOW
CREAT SERPL-MCNC: 0.79 MG/DL
EGFRCR SERPLBLD CKD-EPI 2021: 85 ML/MIN/1.73M2
EOSINOPHIL # BLD AUTO: 0.1 K/UL
EOSINOPHIL NFR BLD AUTO: 1 %
GLUCOSE QUALITATIVE U: NEGATIVE MG/DL
GLUCOSE SERPL-MCNC: 92 MG/DL
HCT VFR BLD CALC: 37.7 %
HGB BLD-MCNC: 12.1 G/DL
IMM GRANULOCYTES NFR BLD AUTO: 0.3 %
KETONES URINE: NEGATIVE MG/DL
LEUKOCYTE ESTERASE URINE: NEGATIVE
LPL SERPL-CCNC: 16 U/L
LYMPHOCYTES # BLD AUTO: 1.62 K/UL
LYMPHOCYTES NFR BLD AUTO: 16.1 %
MAN DIFF?: NORMAL
MCHC RBC-ENTMCNC: 28.4 PG
MCHC RBC-ENTMCNC: 32.1 G/DL
MCV RBC AUTO: 88.5 FL
MONOCYTES # BLD AUTO: 0.79 K/UL
MONOCYTES NFR BLD AUTO: 7.8 %
NEUTROPHILS # BLD AUTO: 7.5 K/UL
NEUTROPHILS NFR BLD AUTO: 74.5 %
NITRITE URINE: NEGATIVE
PH URINE: 6.5
PLATELET # BLD AUTO: 174 K/UL
POTASSIUM SERPL-SCNC: 4.9 MMOL/L
PROT SERPL-MCNC: 6.7 G/DL
PROTEIN URINE: NEGATIVE MG/DL
RBC # BLD: 4.26 M/UL
RBC # FLD: 14 %
SODIUM SERPL-SCNC: 135 MMOL/L
SPECIFIC GRAVITY URINE: 1.01
UROBILINOGEN URINE: 0.2 MG/DL
WBC # FLD AUTO: 10.07 K/UL

## 2025-08-08 PROCEDURE — 74176 CT ABD & PELVIS W/O CONTRAST: CPT

## 2025-08-08 PROCEDURE — 74176 CT ABD & PELVIS W/O CONTRAST: CPT | Mod: 26

## 2025-09-17 ENCOUNTER — APPOINTMENT (OUTPATIENT)
Dept: INTERNAL MEDICINE | Facility: CLINIC | Age: 60
End: 2025-09-17

## 2025-09-17 VITALS
DIASTOLIC BLOOD PRESSURE: 92 MMHG | HEART RATE: 72 BPM | WEIGHT: 207.38 LBS | OXYGEN SATURATION: 99 % | TEMPERATURE: 97.4 F | SYSTOLIC BLOOD PRESSURE: 150 MMHG | BODY MASS INDEX: 31.43 KG/M2 | HEIGHT: 68 IN

## 2025-09-17 DIAGNOSIS — F51.04 PSYCHOPHYSIOLOGIC INSOMNIA: ICD-10-CM

## 2025-09-17 DIAGNOSIS — Z72.821 INADEQUATE SLEEP HYGIENE: ICD-10-CM
